# Patient Record
Sex: FEMALE | Race: WHITE | NOT HISPANIC OR LATINO
[De-identification: names, ages, dates, MRNs, and addresses within clinical notes are randomized per-mention and may not be internally consistent; named-entity substitution may affect disease eponyms.]

---

## 2019-03-09 ENCOUNTER — RESULT REVIEW (OUTPATIENT)
Age: 29
End: 2019-03-09

## 2019-05-28 PROBLEM — Z00.00 ENCOUNTER FOR PREVENTIVE HEALTH EXAMINATION: Status: ACTIVE | Noted: 2019-05-28

## 2019-06-25 ENCOUNTER — APPOINTMENT (OUTPATIENT)
Dept: OTOLARYNGOLOGY | Facility: CLINIC | Age: 29
End: 2019-06-25
Payer: COMMERCIAL

## 2019-06-25 VITALS
HEIGHT: 65 IN | HEART RATE: 57 BPM | DIASTOLIC BLOOD PRESSURE: 75 MMHG | BODY MASS INDEX: 24.16 KG/M2 | WEIGHT: 145 LBS | SYSTOLIC BLOOD PRESSURE: 106 MMHG

## 2019-06-25 PROCEDURE — 31231 NASAL ENDOSCOPY DX: CPT

## 2019-06-25 PROCEDURE — 99244 OFF/OP CNSLTJ NEW/EST MOD 40: CPT | Mod: 25

## 2019-06-25 PROCEDURE — 69210 REMOVE IMPACTED EAR WAX UNI: CPT

## 2019-06-25 RX ORDER — LORATADINE 5 MG/5 ML
10-240 SOLUTION, ORAL ORAL
Refills: 0 | Status: ACTIVE | COMMUNITY

## 2019-06-25 RX ORDER — FLUTICASONE PROPIONATE 50 MCG
50 SPRAY, SUSPENSION NASAL
Refills: 0 | Status: ACTIVE | COMMUNITY

## 2019-06-25 RX ORDER — ALBUTEROL SULFATE 90 UG/1
108 AEROSOL, METERED RESPIRATORY (INHALATION)
Refills: 0 | Status: ACTIVE | COMMUNITY

## 2019-06-25 RX ORDER — DOXYCYCLINE HYCLATE 100 MG/1
100 CAPSULE ORAL
Qty: 28 | Refills: 0 | Status: ACTIVE | COMMUNITY
Start: 2019-06-25 | End: 1900-01-01

## 2019-06-25 RX ORDER — PREDNISONE 10 MG/1
10 TABLET ORAL
Qty: 27 | Refills: 0 | Status: ACTIVE | COMMUNITY
Start: 2019-06-25 | End: 1900-01-01

## 2019-06-25 RX ORDER — METRONIDAZOLE 7.5 MG/G
0.75 GEL TOPICAL
Refills: 0 | Status: ACTIVE | COMMUNITY

## 2019-06-25 RX ORDER — OXYMETHAZOLINE HCL 0.05 MG/1
0.05 SPRAY NASAL
Qty: 1 | Refills: 0 | Status: ACTIVE | COMMUNITY
Start: 2019-06-25 | End: 1900-01-01

## 2019-06-25 NOTE — CONSULT LETTER
[FreeTextEntry1] : Dear Dr. LEE SCHNEIDER \par I had the pleasure of evaluating your patient MONAE RUSHING  thank you for allowing us to participate in their care. please see full note detailing our visit below.\par If you have any questions, please do not hesitate to call me and I would be happy to discuss further. \par \par Jose Garcia M.D.\par Attending Physician,  \par Department of Otolaryngology - Head and Neck Surgery\par [facility] \par Office: [phone]\par Fax: [phone]\par

## 2019-06-25 NOTE — PROCEDURE
[FreeTextEntry6] : Procedure performed: Nasal Endoscopy- Diagnostic\par Pre / post -procedure indication(s): nasal congestion\par Verbal and/or written consent obtained from patient\par Scope #: 1,  flexible fiber optic telescope used\par The scope was introduced in the nasal passage between the middle and inferior turbinates to exam the inferior portion of the middle meatus and the fontanelle, as well as the maxillary ostia.  Next, the scope was passed medically and posteriorly to the middle turbinates to examine the sphenoethmoid recess and the superior turbinate region.\par Upon visualization the finders are as follows:\par Nasal Septum: left septal deviation \par B/L: Mucosa: pink and moist, Mucous: frothy, clear Polyp: not seen, Inferior Turbinate, Middle and Superior Turbinate: inferior turbinate hypertrophy Inferior Meatus: narrow, Middle Meatus: narrow, Super Meatus:normal, Sphenoethmoidal Recess: clear.  Eustachian tube clear. \par The patient tolerated the procedure well\par  [FreeTextEntry3] : Procedure - Cerumen Removal. \par After informed verbal consent is obtained, cerumen is removed from the right ear canal with suction.  Normal appearing canal following removal.\par  [de-identified] : Procedure performed: laryngeal Endoscopy- Diagnostic\par Pre-op/post op indication: dysphonia\par Verbal and/or written consent obtained from patient, Patient was unable to cooperate with mirror\par Scope #: 1, flexible fiber optic telescope used \par Scope was introduced through the nose passed on the floor of the nose to the nasopharynx and then followed down the soft palate to the lower pharynx. The tongue Base, Larynx, Hypopharynx were examined. Base of tongue was symmetric, vallecular was clear, epiglottis was not deformed, subglottis/ pyriform and posterior pharyngeal walls were clear. No erythema, edema, pooling of secretions, masses or lesions. Airway patent, no foreign body visualized. No glottic/supraglottic edema. VF clear arytenoids, vestibular folds, ventricles, pyriform sinuses, and aryepiglottic folds appear normal bilaterally. Vocal cords mobile with good contact b/l.\par \par

## 2019-06-25 NOTE — PHYSICAL EXAM
[Midline] : trachea located in midline position [Normal] : no rashes [de-identified] : Right ear: CI

## 2019-06-25 NOTE — ASSESSMENT
[FreeTextEntry1] : pt with chronic and recurrent acute sinusitis with NSD who has undergone significant treatment \par We will proceed with a regiment of decongestants, antibiotics and irrigation to try to break the cycle of inflation and obstruction. 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 I spoke to LEIDY Bowser regarding the assessment and plan of care.  I preformed the procedures and I reviewed the above assessment and plan of care, and agree. I have made changes in changes in the body of the note where appropriate.\par \par

## 2019-06-25 NOTE — HISTORY OF PRESENT ILLNESS
[de-identified] : 30 y/o female with several year history of recurrent sinus infections. Reports 3 sinus infections a year typically treated with oral abx and steroids. She has constant nasal congestion R>L, mouth breathing, worse at night. When infected she has sinus pressure, discolored mucus, PND and cough. Allergy testing 2 years ago- multiple seasonal allergies- currently taking Claritin daily during Spring, Fall and Summer and Flonase PRN.\par + Runny nose\par + Nasal fracture- 1 y/o\par No changes in smell and taste.\par \par Also with 1 year history of hoarseness which she feels is worse during the end of the week. 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 No fever, chills or unintentional weight loss. \par

## 2019-06-27 ENCOUNTER — APPOINTMENT (OUTPATIENT)
Dept: SPEECH THERAPY | Facility: CLINIC | Age: 29
End: 2019-06-27

## 2019-07-02 ENCOUNTER — APPOINTMENT (OUTPATIENT)
Dept: SPEECH THERAPY | Facility: CLINIC | Age: 29
End: 2019-07-02

## 2019-07-02 ENCOUNTER — OUTPATIENT (OUTPATIENT)
Dept: OUTPATIENT SERVICES | Facility: HOSPITAL | Age: 29
LOS: 1 days | Discharge: HOME | End: 2019-07-02

## 2019-08-01 ENCOUNTER — APPOINTMENT (OUTPATIENT)
Dept: OTOLARYNGOLOGY | Facility: CLINIC | Age: 29
End: 2019-08-01
Payer: COMMERCIAL

## 2019-08-01 VITALS
SYSTOLIC BLOOD PRESSURE: 115 MMHG | DIASTOLIC BLOOD PRESSURE: 73 MMHG | HEART RATE: 63 BPM | HEIGHT: 65 IN | BODY MASS INDEX: 24.99 KG/M2 | WEIGHT: 150 LBS

## 2019-08-01 DIAGNOSIS — J34.89 OTHER SPECIFIED DISORDERS OF NOSE AND NASAL SINUSES: ICD-10-CM

## 2019-08-01 DIAGNOSIS — H61.21 IMPACTED CERUMEN, RIGHT EAR: ICD-10-CM

## 2019-08-01 PROCEDURE — 69210 REMOVE IMPACTED EAR WAX UNI: CPT

## 2019-08-01 PROCEDURE — 99214 OFFICE O/P EST MOD 30 MIN: CPT | Mod: 25

## 2019-08-01 PROCEDURE — 31231 NASAL ENDOSCOPY DX: CPT

## 2019-08-02 PROBLEM — H61.21 IMPACTED CERUMEN OF RIGHT EAR: Status: ACTIVE | Noted: 2019-06-25

## 2019-08-02 PROBLEM — J34.89 RHINORRHEA: Status: ACTIVE | Noted: 2019-06-25

## 2019-08-02 NOTE — CONSULT LETTER
[FreeTextEntry1] : Dear Dr. LEE SCHNEIDER \par I had the pleasure of evaluating your patient MONAE RUSHING  thank you for allowing us to participate in their care. please see full note detailing our visit below.\par If you have any questions, please do not hesitate to call me and I would be happy to discuss further. \par \par Jose Garcia M.D.\par Attending Physician,  \par Department of Otolaryngology - Head and Neck Surgery\[facility] \par Office: [phone]\par Fax: [phone]\par

## 2019-08-02 NOTE — PHYSICAL EXAM
[Midline] : trachea located in midline position [Normal] : no rashes [de-identified] : Right ear: CI

## 2019-08-02 NOTE — ASSESSMENT
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 I spoke to LEIDY Bowser regarding the assessment and plan of care.  I preformed the procedures and I reviewed the above assessment and plan of care, and agree. I have made changes in changes in the body of the note where appropriate.\par \par

## 2019-08-02 NOTE — HISTORY OF PRESENT ILLNESS
[de-identified] : 28 y/o female with several year history of recurrent sinus infections. Reports 3 sinus infections a year typically treated with oral abx and steroids. She has constant nasal congestion R>L, mouth breathing, worse at night. When infected she has sinus pressure, discolored mucus, PND and cough. Allergy testing 2 years ago- multiple seasonal allergies- currently taking Claritin daily during Spring, Fall and Summer and Flonase PRN.\par + Runny nose\par + Nasal fracture- 1 y/o\par No changes in smell and taste.\par \par Also with 1 year history of hoarseness which she feels is worse during the end of the week. 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 No fever, chills or unintentional weight loss. \par  [FreeTextEntry1] : did sinusitis regiment, feels breathing a bit better, no infection, facial pressure resolved \par  \par doing voice therapy, got eval , will continue\par voice stable

## 2019-08-02 NOTE — PROCEDURE
[FreeTextEntry3] : Procedure - Cerumen Removal. \par After informed verbal consent is obtained, cerumen is removed from the right ear canal with suction.  Normal appearing canal following removal.\par  [FreeTextEntry6] : Procedure performed: Nasal Endoscopy- Diagnostic\par Pre / post -procedure indication(s): nasal congestion\par Verbal and/or written consent obtained from patient\par Scope #: 1,  flexible fiber optic telescope used\par The scope was introduced in the nasal passage between the middle and inferior turbinates to exam the inferior portion of the middle meatus and the fontanelle, as well as the maxillary ostia.  Next, the scope was passed medically and posteriorly to the middle turbinates to examine the sphenoethmoid recess and the superior turbinate region.\par Upon visualization the finders are as follows:\par Nasal Septum: left septal deviation \par B/L: Mucosa: pink and moist, Mucous: frothy, clear Polyp: not seen, Inferior Turbinate, Middle and Superior Turbinate: inferior turbinate hypertrophy Inferior Meatus: narrow, Middle Meatus: narrow, Super Meatus:normal, Sphenoethmoidal Recess: clear.  Eustachian tube clear. \par The patient tolerated the procedure well\par  [de-identified] : Procedure performed: laryngeal Endoscopy- Diagnostic\par Pre-op/post op indication: dysphonia\par Verbal and/or written consent obtained from patient, Patient was unable to cooperate with mirror\par Scope #: 1, flexible fiber optic telescope used \par Scope was introduced through the nose passed on the floor of the nose to the nasopharynx and then followed down the soft palate to the lower pharynx. The tongue Base, Larynx, Hypopharynx were examined. Base of tongue was symmetric, vallecular was clear, epiglottis was not deformed, subglottis/ pyriform and posterior pharyngeal walls were clear. No erythema, edema, pooling of secretions, masses or lesions. Airway patent, no foreign body visualized. No glottic/supraglottic edema. VF clear arytenoids, vestibular folds, ventricles, pyriform sinuses, and aryepiglottic folds appear normal bilaterally. Vocal cords mobile with good contact b/l.\par \par

## 2019-08-06 DIAGNOSIS — R49.0 DYSPHONIA: ICD-10-CM

## 2020-01-07 ENCOUNTER — APPOINTMENT (OUTPATIENT)
Dept: OTOLARYNGOLOGY | Facility: CLINIC | Age: 30
End: 2020-01-07
Payer: COMMERCIAL

## 2020-01-07 VITALS
HEART RATE: 63 BPM | DIASTOLIC BLOOD PRESSURE: 65 MMHG | SYSTOLIC BLOOD PRESSURE: 104 MMHG | BODY MASS INDEX: 24.99 KG/M2 | HEIGHT: 65 IN | WEIGHT: 150 LBS

## 2020-01-07 DIAGNOSIS — R49.0 DYSPHONIA: ICD-10-CM

## 2020-01-07 DIAGNOSIS — J34.2 DEVIATED NASAL SEPTUM: ICD-10-CM

## 2020-01-07 DIAGNOSIS — J32.4 CHRONIC PANSINUSITIS: ICD-10-CM

## 2020-01-07 DIAGNOSIS — R09.81 NASAL CONGESTION: ICD-10-CM

## 2020-01-07 DIAGNOSIS — J34.3 HYPERTROPHY OF NASAL TURBINATES: ICD-10-CM

## 2020-01-07 PROCEDURE — 99214 OFFICE O/P EST MOD 30 MIN: CPT | Mod: 25

## 2020-01-07 PROCEDURE — 31231 NASAL ENDOSCOPY DX: CPT

## 2020-01-07 NOTE — PHYSICAL EXAM
[Midline] : trachea located in midline position [Normal] : orientation to person, place, and time: normal [de-identified] : Right ear: CI

## 2020-01-07 NOTE — CONSULT LETTER
[FreeTextEntry1] : Dear Dr. LEE SCHNEIDER \par I had the pleasure of evaluating your patient MONAE RUSHING  thank you for allowing us to participate in their care. please see full note detailing our visit below.\par If you have any questions, please do not hesitate to call me and I would be happy to discuss further. \par \par Jose Garcia M.D.\par Attending Physician,  \par Department of Otolaryngology - Head and Neck Surgery\par [facility] \par Office: [phone]\par Fax: [phone]\par

## 2020-01-07 NOTE — ASSESSMENT
[FreeTextEntry1] : Pt with chronic and recurrent acute sinusitis with NSD who has undergone significant treatment \par with some response to sinusitis regiment, will continue medical tx and see if continues to occur, if recurs in immediate future will consider imaging/ procedure \par - Continue saline spray\par \par Dysphonia with laryngeal hyperfunction and early nodules\par - Voice therapy\par \par \par I personally saw and examined MONAE RUSHING in detail. I spoke to LEIDY Bowser regarding the assessment and plan of care.  I preformed the procedures and I reviewed the above assessment and plan of care, and agree. I have made changes in changes in the body of the note where appropriate.\par \par \par \par

## 2020-01-07 NOTE — PROCEDURE
[FreeTextEntry3] : Procedure - Cerumen Removal. \par After informed verbal consent is obtained, cerumen is removed from the right ear canal with suction.  Normal appearing canal following removal.\par  [FreeTextEntry6] : Procedure performed: Nasal Endoscopy- Diagnostic\par Pre / post -procedure indication(s): nasal congestion\par Verbal and/or written consent obtained from patient\par Scope #: 1,  flexible fiber optic telescope used\par The scope was introduced in the nasal passage between the middle and inferior turbinates to exam the inferior portion of the middle meatus and the fontanelle, as well as the maxillary ostia.  Next, the scope was passed medically and posteriorly to the middle turbinates to examine the sphenoethmoid recess and the superior turbinate region.\par Upon visualization the finders are as follows:\par Nasal Septum: left septal deviation \par B/L: Mucosa: pink and moist, Mucous: frothy, clear Polyp: not seen, Inferior Turbinate, Middle and Superior Turbinate: inferior turbinate hypertrophy Inferior Meatus: narrow, Middle Meatus: narrow, Super Meatus:normal, Sphenoethmoidal Recess: clear.  Eustachian tube clear. \par The patient tolerated the procedure well\par  [de-identified] : Procedure performed: laryngeal Endoscopy- Diagnostic\par Pre-op/post op indication: dysphonia\par Verbal and/or written consent obtained from patient, Patient was unable to cooperate with mirror\par Scope #: 1, flexible fiber optic telescope used \par Scope was introduced through the nose passed on the floor of the nose to the nasopharynx and then followed down the soft palate to the lower pharynx. The tongue Base, Larynx, Hypopharynx were examined. Base of tongue was symmetric, vallecular was clear, epiglottis was not deformed, subglottis/ pyriform and posterior pharyngeal walls were clear. No erythema, edema, pooling of secretions, masses or lesions. Airway patent, no foreign body visualized. No glottic/supraglottic edema. VF clear arytenoids, vestibular folds, ventricles, pyriform sinuses, and aryepiglottic folds appear normal bilaterally. Vocal cords mobile with good contact b/l.\par \par

## 2020-01-07 NOTE — HISTORY OF PRESENT ILLNESS
[de-identified] : 29 y/o female with several year history of recurrent sinus infections. Reports 3 sinus infections a year typically treated with oral abx and steroids. She has constant nasal congestion R>L, mouth breathing, worse at night. Allergy testing 2 years ago- multiple seasonal allergies, year round.Sinus infections and nasal congestion usually worse in the Spring and Fall. She was treated with sinusitis regimen 6 months ago and hasn't had sinus infection since. Still with mostly mouth breathing and nasal congestion R>L. She's still using saline spray daily. \par + nasal fracture- 1 y/o\par No changes in smell and taste.\par \par Also with over 1 year history of hoarseness which she feels is worse during the end of the week. 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 No fever, chills or unintentional weight loss. \par  [FreeTextEntry1] : \par  \par

## 2020-09-29 ENCOUNTER — RESULT REVIEW (OUTPATIENT)
Age: 30
End: 2020-09-29

## 2021-03-08 ENCOUNTER — FORM ENCOUNTER (OUTPATIENT)
Age: 31
End: 2021-03-08

## 2021-03-09 ENCOUNTER — APPOINTMENT (OUTPATIENT)
Dept: OBGYN | Facility: CLINIC | Age: 31
End: 2021-03-09
Payer: COMMERCIAL

## 2021-03-09 ENCOUNTER — TRANSCRIPTION ENCOUNTER (OUTPATIENT)
Age: 31
End: 2021-03-09

## 2021-03-09 PROCEDURE — 81025 URINE PREGNANCY TEST: CPT

## 2021-03-09 PROCEDURE — 99213 OFFICE O/P EST LOW 20 MIN: CPT | Mod: 25

## 2021-03-09 PROCEDURE — 99385 PREV VISIT NEW AGE 18-39: CPT

## 2021-03-09 PROCEDURE — 36415 COLL VENOUS BLD VENIPUNCTURE: CPT

## 2021-03-09 PROCEDURE — 76817 TRANSVAGINAL US OBSTETRIC: CPT

## 2021-03-09 PROCEDURE — 99072 ADDL SUPL MATRL&STAF TM PHE: CPT

## 2021-03-14 ENCOUNTER — FORM ENCOUNTER (OUTPATIENT)
Age: 31
End: 2021-03-14

## 2021-03-23 ENCOUNTER — FORM ENCOUNTER (OUTPATIENT)
Age: 31
End: 2021-03-23

## 2021-04-02 ENCOUNTER — NON-APPOINTMENT (OUTPATIENT)
Age: 31
End: 2021-04-02

## 2021-04-12 ENCOUNTER — FORM ENCOUNTER (OUTPATIENT)
Age: 31
End: 2021-04-12

## 2021-04-19 ENCOUNTER — FORM ENCOUNTER (OUTPATIENT)
Age: 31
End: 2021-04-19

## 2021-04-20 ENCOUNTER — APPOINTMENT (OUTPATIENT)
Dept: OBGYN | Facility: CLINIC | Age: 31
End: 2021-04-20

## 2021-04-20 ENCOUNTER — APPOINTMENT (OUTPATIENT)
Dept: OBGYN | Facility: CLINIC | Age: 31
End: 2021-04-20
Payer: COMMERCIAL

## 2021-04-20 PROCEDURE — 99072 ADDL SUPL MATRL&STAF TM PHE: CPT

## 2021-04-20 PROCEDURE — 76813 OB US NUCHAL MEAS 1 GEST: CPT

## 2021-04-29 ENCOUNTER — FORM ENCOUNTER (OUTPATIENT)
Age: 31
End: 2021-04-29

## 2021-05-04 ENCOUNTER — FORM ENCOUNTER (OUTPATIENT)
Age: 31
End: 2021-05-04

## 2021-05-18 ENCOUNTER — APPOINTMENT (OUTPATIENT)
Dept: OBGYN | Facility: CLINIC | Age: 31
End: 2021-05-18
Payer: COMMERCIAL

## 2021-05-18 PROCEDURE — 36415 COLL VENOUS BLD VENIPUNCTURE: CPT

## 2021-05-18 PROCEDURE — 0502F SUBSEQUENT PRENATAL CARE: CPT

## 2021-06-03 ENCOUNTER — FORM ENCOUNTER (OUTPATIENT)
Age: 31
End: 2021-06-03

## 2021-06-11 ENCOUNTER — ASOB RESULT (OUTPATIENT)
Age: 31
End: 2021-06-11

## 2021-06-11 ENCOUNTER — APPOINTMENT (OUTPATIENT)
Dept: ANTEPARTUM | Facility: CLINIC | Age: 31
End: 2021-06-11
Payer: COMMERCIAL

## 2021-06-11 PROCEDURE — 76811 OB US DETAILED SNGL FETUS: CPT

## 2021-06-22 ENCOUNTER — APPOINTMENT (OUTPATIENT)
Dept: OBGYN | Facility: CLINIC | Age: 31
End: 2021-06-22
Payer: COMMERCIAL

## 2021-06-22 PROCEDURE — 0502F SUBSEQUENT PRENATAL CARE: CPT

## 2021-07-28 ENCOUNTER — FORM ENCOUNTER (OUTPATIENT)
Age: 31
End: 2021-07-28

## 2021-07-28 ENCOUNTER — APPOINTMENT (OUTPATIENT)
Dept: OBGYN | Facility: CLINIC | Age: 31
End: 2021-07-28
Payer: COMMERCIAL

## 2021-07-28 PROCEDURE — 0502F SUBSEQUENT PRENATAL CARE: CPT

## 2021-07-29 ENCOUNTER — FORM ENCOUNTER (OUTPATIENT)
Age: 31
End: 2021-07-29

## 2021-08-24 ENCOUNTER — FORM ENCOUNTER (OUTPATIENT)
Age: 31
End: 2021-08-24

## 2021-08-25 ENCOUNTER — APPOINTMENT (OUTPATIENT)
Dept: OBGYN | Facility: CLINIC | Age: 31
End: 2021-08-25
Payer: COMMERCIAL

## 2021-08-25 VITALS
HEIGHT: 65 IN | DIASTOLIC BLOOD PRESSURE: 72 MMHG | BODY MASS INDEX: 38.65 KG/M2 | WEIGHT: 232 LBS | SYSTOLIC BLOOD PRESSURE: 110 MMHG

## 2021-08-25 PROCEDURE — 76816 OB US FOLLOW-UP PER FETUS: CPT

## 2021-08-25 PROCEDURE — 90471 IMMUNIZATION ADMIN: CPT

## 2021-08-25 PROCEDURE — 0502F SUBSEQUENT PRENATAL CARE: CPT

## 2021-08-25 PROCEDURE — 90715 TDAP VACCINE 7 YRS/> IM: CPT

## 2021-08-31 ENCOUNTER — FORM ENCOUNTER (OUTPATIENT)
Age: 31
End: 2021-08-31

## 2021-09-15 ENCOUNTER — NON-APPOINTMENT (OUTPATIENT)
Age: 31
End: 2021-09-15

## 2021-09-15 DIAGNOSIS — J45.909 UNSPECIFIED ASTHMA, UNCOMPLICATED: ICD-10-CM

## 2021-09-15 DIAGNOSIS — E78.00 PURE HYPERCHOLESTEROLEMIA, UNSPECIFIED: ICD-10-CM

## 2021-09-15 DIAGNOSIS — Z83.3 FAMILY HISTORY OF DIABETES MELLITUS: ICD-10-CM

## 2021-09-15 DIAGNOSIS — Z78.9 OTHER SPECIFIED HEALTH STATUS: ICD-10-CM

## 2021-09-15 DIAGNOSIS — Z92.89 PERSONAL HISTORY OF OTHER MEDICAL TREATMENT: ICD-10-CM

## 2021-09-15 DIAGNOSIS — Z82.49 FAMILY HISTORY OF ISCHEMIC HEART DISEASE AND OTHER DISEASES OF THE CIRCULATORY SYSTEM: ICD-10-CM

## 2021-09-15 DIAGNOSIS — O99.213 OBESITY COMPLICATING PREGNANCY, THIRD TRIMESTER: ICD-10-CM

## 2021-09-15 DIAGNOSIS — Z80.3 FAMILY HISTORY OF MALIGNANT NEOPLASM OF BREAST: ICD-10-CM

## 2021-09-15 RX ORDER — VITAMIN A ACETATE, ASCORBIC ACID, CHOLECALCIFEROL, ALPHA-TOCOPHEROL ACETATE, DL, THIAMINE MONONITRATE, RIBOFLAVIN, NIACINAMIDE, PYRIDOXINE HYDROCHLORIDE, FOLIC ACID, CYANOCOBALAMIN, BIOTIN, CALCIUM PANTOTHENATE, CALCIUM CARBONATE, FERROUS FUMARATE, POTASSIUM IODIDE, MAGNESIUM OXIDE, ZINC OXIDE AND CUPRIC OXIDE 2500; 80; 400; 10; 3; 3.4; 20; 20; 1; 12; 300; 6; 120; 27; 150; 30; 15; 2 [IU]/1; MG/1; [IU]/1; [IU]/1; MG/1; MG/1; MG/1; MG/1; MG/1; UG/1; UG/1; MG/1; MG/1; MG/1; UG/1; UG/1; UG/1; MG/1
TABLET, FILM COATED ORAL
Refills: 0 | Status: ACTIVE | COMMUNITY

## 2021-09-20 ENCOUNTER — FORM ENCOUNTER (OUTPATIENT)
Age: 31
End: 2021-09-20

## 2021-09-20 ENCOUNTER — APPOINTMENT (OUTPATIENT)
Dept: PEDIATRICS | Facility: CLINIC | Age: 31
End: 2021-09-20
Payer: COMMERCIAL

## 2021-09-20 PROCEDURE — 99203 OFFICE O/P NEW LOW 30 MIN: CPT | Mod: 95

## 2021-09-21 ENCOUNTER — APPOINTMENT (OUTPATIENT)
Dept: OBGYN | Facility: CLINIC | Age: 31
End: 2021-09-21
Payer: COMMERCIAL

## 2021-09-21 PROCEDURE — 0502F SUBSEQUENT PRENATAL CARE: CPT

## 2021-09-22 ENCOUNTER — TRANSCRIPTION ENCOUNTER (OUTPATIENT)
Age: 31
End: 2021-09-22

## 2021-09-23 ENCOUNTER — EMERGENCY (EMERGENCY)
Facility: HOSPITAL | Age: 31
LOS: 1 days | Discharge: HOME | End: 2021-09-23
Admitting: EMERGENCY MEDICINE
Payer: COMMERCIAL

## 2021-09-23 ENCOUNTER — APPOINTMENT (OUTPATIENT)
Age: 31
End: 2021-09-23

## 2021-09-23 VITALS
RESPIRATION RATE: 16 BRPM | DIASTOLIC BLOOD PRESSURE: 84 MMHG | WEIGHT: 235.01 LBS | SYSTOLIC BLOOD PRESSURE: 134 MMHG | TEMPERATURE: 99 F | OXYGEN SATURATION: 98 % | HEART RATE: 77 BPM | HEIGHT: 65 IN

## 2021-09-23 VITALS
HEART RATE: 79 BPM | RESPIRATION RATE: 18 BRPM | TEMPERATURE: 98 F | OXYGEN SATURATION: 99 % | DIASTOLIC BLOOD PRESSURE: 63 MMHG | SYSTOLIC BLOOD PRESSURE: 107 MMHG

## 2021-09-23 DIAGNOSIS — U07.1 COVID-19: ICD-10-CM

## 2021-09-23 DIAGNOSIS — Z88.0 ALLERGY STATUS TO PENICILLIN: ICD-10-CM

## 2021-09-23 DIAGNOSIS — R05 COUGH: ICD-10-CM

## 2021-09-23 PROCEDURE — M0243: CPT | Mod: 52

## 2021-09-23 PROCEDURE — L9998: CPT

## 2021-09-23 RX ORDER — EPINEPHRINE 0.3 MG/.3ML
0.3 INJECTION INTRAMUSCULAR; SUBCUTANEOUS ONCE
Refills: 0 | Status: DISCONTINUED | OUTPATIENT
Start: 2021-09-23 | End: 2021-09-27

## 2021-09-23 RX ORDER — DIPHENHYDRAMINE HCL 50 MG
50 CAPSULE ORAL ONCE
Refills: 0 | Status: DISCONTINUED | OUTPATIENT
Start: 2021-09-23 | End: 2021-09-27

## 2021-09-23 RX ORDER — FAMOTIDINE 10 MG/ML
20 INJECTION INTRAVENOUS ONCE
Refills: 0 | Status: DISCONTINUED | OUTPATIENT
Start: 2021-09-23 | End: 2021-09-27

## 2021-09-23 RX ORDER — SODIUM CHLORIDE 9 MG/ML
250 INJECTION INTRAMUSCULAR; INTRAVENOUS; SUBCUTANEOUS
Refills: 0 | Status: COMPLETED | OUTPATIENT
Start: 2021-09-23 | End: 2021-09-23

## 2021-09-23 RX ADMIN — SODIUM CHLORIDE 310 MILLILITER(S): 9 INJECTION INTRAMUSCULAR; INTRAVENOUS; SUBCUTANEOUS at 18:16

## 2021-09-23 NOTE — ED PROVIDER NOTE - NS ED ROS FT
CONSTITUTIONAL:  No fever or chills, no bodyaches  HEENT:  No sore throat or headache, no nasal congestion  PULM:  +cough  GI:  No diarrhea, no vomiting

## 2021-09-23 NOTE — ED PROVIDER NOTE - OBJECTIVE STATEMENT
Patient is scheduled for [facility] and requesting no professional billing.  reports congestion, sinus pressure and cough x 5 days.   pt fully vaccinated

## 2021-09-23 NOTE — ED PROVIDER NOTE - PATIENT PORTAL LINK FT
You can access the FollowMyHealth Patient Portal offered by [facility] by registering at the following website: [email or web address]. By joining [facility]’s FollowMyHealth portal, you will also be able to view your health information using other applications (apps) compatible with our system.

## 2021-09-23 NOTE — ED PROVIDER NOTE - PROGRESS NOTE DETAILS
Patient has history of COVID-19 symptoms for [5  ] days    Patient has high risk factors that include:  [  ] Chronic Kidney Disease [  ] Diabetes Mellitus [  ] Immune Suppressive [  ] Receiving Immunosuppressive Treatment [ x ] Overweight/Obesity BMI greater than 25 [  ] Age greater than or equal 65 years [  ] Cardiovascular Disease [  ] HTN [  ] COPD/Other Chronic Respiratory Disease [  ] Sickle Cell Disease [  ] Congenital/Acquired Heart Disease [  ] Neurodevelopmental Disorder [  ] Medical related technology dependence [  ] Asthma/Chronic Respiratory Disease [  ] Immune Suppressive Disease [  ] Receiving Immune Suppressive Therapy [  ] Other: pregnant     Patient received prior COVID treatment that included [  ]  Patient provided explanation of monoclonal antibody infusion and is in agreement with treatment plan. See consent form in medical record. [  ] Patient received monoclonal antibody infusion with no adverse reaction. Patient planned for discharge home and follow up with outpatient [facility] program.    [  ] Patient received monoclonal antibody infusion and experienced an adverse reaction. Symptoms include [  ]. Patient treated with [  ]. Patient planned for [  ] [ x ] Patient received monoclonal antibody infusion with no adverse reaction. Patient planned for discharge home and follow up with outpatient [facility] program.

## 2021-09-24 ENCOUNTER — APPOINTMENT (OUTPATIENT)
Dept: DISASTER EMERGENCY | Facility: HOSPITAL | Age: 31
End: 2021-09-24

## 2021-09-29 ENCOUNTER — APPOINTMENT (OUTPATIENT)
Dept: OBGYN | Facility: CLINIC | Age: 31
End: 2021-09-29

## 2021-10-01 ENCOUNTER — APPOINTMENT (OUTPATIENT)
Dept: OBGYN | Facility: CLINIC | Age: 31
End: 2021-10-01

## 2021-10-04 DIAGNOSIS — Z34.00 ENCOUNTER FOR SUPERVISION OF NORMAL FIRST PREGNANCY, UNSPECIFIED TRIMESTER: ICD-10-CM

## 2021-10-04 DIAGNOSIS — U07.1 COVID-19: ICD-10-CM

## 2021-10-07 ENCOUNTER — APPOINTMENT (OUTPATIENT)
Dept: OBGYN | Facility: CLINIC | Age: 31
End: 2021-10-07

## 2021-10-15 ENCOUNTER — APPOINTMENT (OUTPATIENT)
Dept: OBGYN | Facility: CLINIC | Age: 31
End: 2021-10-15
Payer: COMMERCIAL

## 2021-10-15 PROCEDURE — 0502F SUBSEQUENT PRENATAL CARE: CPT

## 2021-10-20 ENCOUNTER — APPOINTMENT (OUTPATIENT)
Dept: OBGYN | Facility: CLINIC | Age: 31
End: 2021-10-20
Payer: COMMERCIAL

## 2021-10-20 PROCEDURE — 0502F SUBSEQUENT PRENATAL CARE: CPT

## 2021-10-26 ENCOUNTER — ASOB RESULT (OUTPATIENT)
Age: 31
End: 2021-10-26

## 2021-10-26 ENCOUNTER — APPOINTMENT (OUTPATIENT)
Dept: OBGYN | Facility: CLINIC | Age: 31
End: 2021-10-26
Payer: COMMERCIAL

## 2021-10-26 PROCEDURE — 76818 FETAL BIOPHYS PROFILE W/NST: CPT

## 2021-10-26 PROCEDURE — 76816 OB US FOLLOW-UP PER FETUS: CPT

## 2021-10-29 ENCOUNTER — INPATIENT (INPATIENT)
Facility: HOSPITAL | Age: 31
LOS: 3 days | Discharge: HOME | End: 2021-11-02
Attending: OBSTETRICS & GYNECOLOGY | Admitting: OBSTETRICS & GYNECOLOGY
Payer: COMMERCIAL

## 2021-10-29 ENCOUNTER — APPOINTMENT (OUTPATIENT)
Dept: OBGYN | Facility: CLINIC | Age: 31
End: 2021-10-29
Payer: COMMERCIAL

## 2021-10-29 VITALS — OXYGEN SATURATION: 98 % | HEART RATE: 94 BPM

## 2021-10-29 DIAGNOSIS — O26.899 OTHER SPECIFIED PREGNANCY RELATED CONDITIONS, UNSPECIFIED TRIMESTER: ICD-10-CM

## 2021-10-29 DIAGNOSIS — Z3A.00 WEEKS OF GESTATION OF PREGNANCY NOT SPECIFIED: ICD-10-CM

## 2021-10-29 DIAGNOSIS — Z34.80 ENCOUNTER FOR SUPERVISION OF OTHER NORMAL PREGNANCY, UNSPECIFIED TRIMESTER: ICD-10-CM

## 2021-10-29 LAB
ALBUMIN SERPL ELPH-MCNC: 3.4 G/DL — SIGNIFICANT CHANGE UP (ref 3.3–5)
ALP SERPL-CCNC: 133 U/L — HIGH (ref 40–120)
ALT FLD-CCNC: 19 U/L — SIGNIFICANT CHANGE UP (ref 10–45)
ANION GAP SERPL CALC-SCNC: 12 MMOL/L — SIGNIFICANT CHANGE UP (ref 5–17)
APPEARANCE UR: CLEAR — SIGNIFICANT CHANGE UP
APTT BLD: 26.9 SEC — LOW (ref 27.5–35.5)
AST SERPL-CCNC: 22 U/L — SIGNIFICANT CHANGE UP (ref 10–40)
BASOPHILS # BLD AUTO: 0.03 K/UL — SIGNIFICANT CHANGE UP (ref 0–0.2)
BASOPHILS NFR BLD AUTO: 0.2 % — SIGNIFICANT CHANGE UP (ref 0–2)
BILIRUB SERPL-MCNC: 0.5 MG/DL — SIGNIFICANT CHANGE UP (ref 0.2–1.2)
BILIRUB UR-MCNC: NEGATIVE — SIGNIFICANT CHANGE UP
BLD GP AB SCN SERPL QL: NEGATIVE — SIGNIFICANT CHANGE UP
BUN SERPL-MCNC: 10 MG/DL — SIGNIFICANT CHANGE UP (ref 7–23)
CALCIUM SERPL-MCNC: 9.3 MG/DL — SIGNIFICANT CHANGE UP (ref 8.4–10.5)
CHLORIDE SERPL-SCNC: 103 MMOL/L — SIGNIFICANT CHANGE UP (ref 96–108)
CO2 SERPL-SCNC: 19 MMOL/L — LOW (ref 22–31)
COLOR SPEC: SIGNIFICANT CHANGE UP
CREAT ?TM UR-MCNC: 70 MG/DL — SIGNIFICANT CHANGE UP
CREAT SERPL-MCNC: 0.45 MG/DL — LOW (ref 0.5–1.3)
DIFF PNL FLD: NEGATIVE — SIGNIFICANT CHANGE UP
EOSINOPHIL # BLD AUTO: 0.07 K/UL — SIGNIFICANT CHANGE UP (ref 0–0.5)
EOSINOPHIL NFR BLD AUTO: 0.6 % — SIGNIFICANT CHANGE UP (ref 0–6)
FIBRINOGEN PPP-MCNC: 830 MG/DL — HIGH (ref 290–520)
GLUCOSE SERPL-MCNC: 86 MG/DL — SIGNIFICANT CHANGE UP (ref 70–99)
GLUCOSE UR QL: NEGATIVE — SIGNIFICANT CHANGE UP
HCT VFR BLD CALC: 41.5 % — SIGNIFICANT CHANGE UP (ref 34.5–45)
HGB BLD-MCNC: 13.3 G/DL — SIGNIFICANT CHANGE UP (ref 11.5–15.5)
IMM GRANULOCYTES NFR BLD AUTO: 0.6 % — SIGNIFICANT CHANGE UP (ref 0–1.5)
INR BLD: 0.89 RATIO — SIGNIFICANT CHANGE UP (ref 0.88–1.16)
KETONES UR-MCNC: NEGATIVE — SIGNIFICANT CHANGE UP
LDH SERPL L TO P-CCNC: 184 U/L — SIGNIFICANT CHANGE UP (ref 50–242)
LEUKOCYTE ESTERASE UR-ACNC: NEGATIVE — SIGNIFICANT CHANGE UP
LYMPHOCYTES # BLD AUTO: 1.85 K/UL — SIGNIFICANT CHANGE UP (ref 1–3.3)
LYMPHOCYTES # BLD AUTO: 15.2 % — SIGNIFICANT CHANGE UP (ref 13–44)
MCHC RBC-ENTMCNC: 27.8 PG — SIGNIFICANT CHANGE UP (ref 27–34)
MCHC RBC-ENTMCNC: 32 GM/DL — SIGNIFICANT CHANGE UP (ref 32–36)
MCV RBC AUTO: 86.6 FL — SIGNIFICANT CHANGE UP (ref 80–100)
MONOCYTES # BLD AUTO: 0.72 K/UL — SIGNIFICANT CHANGE UP (ref 0–0.9)
MONOCYTES NFR BLD AUTO: 5.9 % — SIGNIFICANT CHANGE UP (ref 2–14)
NEUTROPHILS # BLD AUTO: 9.41 K/UL — HIGH (ref 1.8–7.4)
NEUTROPHILS NFR BLD AUTO: 77.5 % — HIGH (ref 43–77)
NITRITE UR-MCNC: NEGATIVE — SIGNIFICANT CHANGE UP
NRBC # BLD: 0 /100 WBCS — SIGNIFICANT CHANGE UP (ref 0–0)
PH UR: 7 — SIGNIFICANT CHANGE UP (ref 5–8)
PLATELET # BLD AUTO: 245 K/UL — SIGNIFICANT CHANGE UP (ref 150–400)
POTASSIUM SERPL-MCNC: 3.9 MMOL/L — SIGNIFICANT CHANGE UP (ref 3.5–5.3)
POTASSIUM SERPL-SCNC: 3.9 MMOL/L — SIGNIFICANT CHANGE UP (ref 3.5–5.3)
PROT ?TM UR-MCNC: 8 MG/DL — SIGNIFICANT CHANGE UP (ref 0–12)
PROT SERPL-MCNC: 6.3 G/DL — SIGNIFICANT CHANGE UP (ref 6–8.3)
PROT UR-MCNC: NEGATIVE — SIGNIFICANT CHANGE UP
PROT/CREAT UR-RTO: 0.1 RATIO — SIGNIFICANT CHANGE UP (ref 0–0.2)
PROTHROM AB SERPL-ACNC: 10.7 SEC — SIGNIFICANT CHANGE UP (ref 10.6–13.6)
RBC # BLD: 4.79 M/UL — SIGNIFICANT CHANGE UP (ref 3.8–5.2)
RBC # FLD: 14.7 % — HIGH (ref 10.3–14.5)
RH IG SCN BLD-IMP: POSITIVE — SIGNIFICANT CHANGE UP
SARS-COV-2 RNA SPEC QL NAA+PROBE: SIGNIFICANT CHANGE UP
SODIUM SERPL-SCNC: 134 MMOL/L — LOW (ref 135–145)
SP GR SPEC: 1.02 — SIGNIFICANT CHANGE UP (ref 1.01–1.02)
URATE SERPL-MCNC: 3.8 MG/DL — SIGNIFICANT CHANGE UP (ref 2.5–7)
UROBILINOGEN FLD QL: NEGATIVE — SIGNIFICANT CHANGE UP
WBC # BLD: 12.15 K/UL — HIGH (ref 3.8–10.5)
WBC # FLD AUTO: 12.15 K/UL — HIGH (ref 3.8–10.5)

## 2021-10-29 PROCEDURE — 59025 FETAL NON-STRESS TEST: CPT

## 2021-10-29 RX ORDER — SODIUM CHLORIDE 9 MG/ML
1000 INJECTION, SOLUTION INTRAVENOUS
Refills: 0 | Status: DISCONTINUED | OUTPATIENT
Start: 2021-10-29 | End: 2021-10-31

## 2021-10-29 RX ORDER — CITRIC ACID/SODIUM CITRATE 300-500 MG
15 SOLUTION, ORAL ORAL EVERY 6 HOURS
Refills: 0 | Status: DISCONTINUED | OUTPATIENT
Start: 2021-10-29 | End: 2021-10-31

## 2021-10-29 RX ORDER — OXYTOCIN 10 UNIT/ML
333.33 VIAL (ML) INJECTION
Qty: 20 | Refills: 0 | Status: COMPLETED | OUTPATIENT
Start: 2021-10-29 | End: 2021-10-29

## 2021-10-29 RX ORDER — INFLUENZA VIRUS VACCINE 15; 15; 15; 15 UG/.5ML; UG/.5ML; UG/.5ML; UG/.5ML
0.5 SUSPENSION INTRAMUSCULAR ONCE
Refills: 0 | Status: COMPLETED | OUTPATIENT
Start: 2021-10-29 | End: 2021-10-31

## 2021-10-29 NOTE — OB PROVIDER H&P - HISTORY OF PRESENT ILLNESS
HPI: Pt is a 31y G_P_  presenting for X.  FM  LOF (color/time)  CTX (frequency, when they started)  VB  GBS pos/neg  EFW:    PNC complicated by     OBHx:  GynHx: Denies hx of fibroids, STDs, or other gynecologic issues  PMHx: Denies  PSHx: Denies  Med: PNV  All: NKDA  Psych: Denies hx of mental health issues  SH: Denies hx of smoking, drinking, or drug usage during the pregnancy    Vital Signs Last 24 Hrs  T(C): 37.5 (29 Oct 2021 11:31), Max: 37.5 (29 Oct 2021 11:29)  T(F): 99.5 (29 Oct 2021 11:31), Max: 99.5 (29 Oct 2021 11:29)  HR: 76 (29 Oct 2021 11:56) (76 - 94)  BP: 116/71 (29 Oct 2021 11:44) (116/71 - 119/76)  BP(mean): --  RR: 16 (29 Oct 2021 11:31) (16 - 16)  SpO2: 98% (29 Oct 2021 11:56) (97% - 98%)    SVE:  FHT:  [place]:  EFW: Sono/leopolds  Sono: Vertex           HPI: Pt is a 31y  @ 40.2wga presenting after having had decels in the office   FM (+)  LOF (-)  CTX (-)  VB (-)  GBS neg  EFW: 3430g    PNC uncomplicated per pt     OBHx:   GynHx: Denies hx of fibroids, STDs, or other gynecologic issues  PMHx: Asthma, last used inhaler years ago   PSHx: Denies  Med: PNV  All: PCN->rash  Psych: Denies hx of mental health issues  SH: Denies hx of smoking, drinking, or drug usage during the pregnancy    Vital Signs Last 24 Hrs  T(C): 37.5 (29 Oct 2021 11:31), Max: 37.5 (29 Oct 2021 11:29)  T(F): 99.5 (29 Oct 2021 11:31), Max: 99.5 (29 Oct 2021 11:29)  HR: 76 (29 Oct 2021 11:56) (76 - 94)  BP: 116/71 (29 Oct 2021 11:44) (116/71 - 119/76)  BP(mean): --  RR: 16 (29 Oct 2021 11:31) (16 - 16)  SpO2: 98% (29 Oct 2021 11:56) (97% - 98%)    SVE:   FHT:  [place]:  Sono: Cephalic            HPI: Pt is a 31y  @ 40.2wga presenting after having had decels in the office   FM (+)  LOF (-)  CTX (-)  VB (-)  GBS neg  EFW: 3430g    PNC uncomplicated per pt     OBHx:   GynHx: Denies hx of fibroids, STDs, or other gynecologic issues  PMHx: Asthma, last used inhaler years ago   PSHx: Denies  Med: PNV  All: PCN->rash  Psych: Denies hx of mental health issues  SH: Denies hx of smoking, drinking, or drug usage during the pregnancy    Vital Signs Last 24 Hrs  T(C): 37.5 (29 Oct 2021 11:31), Max: 37.5 (29 Oct 2021 11:29)  T(F): 99.5 (29 Oct 2021 11:31), Max: 99.5 (29 Oct 2021 11:29)  HR: 76 (29 Oct 2021 11:56) (76 - 94)  BP: 116/71 (29 Oct 2021 11:44) (116/71 - 119/76)  BP(mean): --  RR: 16 (29 Oct 2021 11:31) (16 - 16)  SpO2: 98% (29 Oct 2021 11:56) (97% - 98%)    SVE: /60/-3  FHT: 130/mod/(+)accels/(-)decels   [place]: Irregular   Sono: Cephalic

## 2021-10-29 NOTE — OB PROVIDER H&P - ASSESSMENT
A/P: Pt is a 31y yo G_P_ who presents [active labor/SROM/induction of labor].  - prenatal issues, GBS status    1. Admit to LND. Routine Labs. IVF  2. Expectant Management vs. IOL  3. Fetus: Cat X tracing, Vertex, EFW _ . C/w EFM.  4. List all prenatal issues w/ a plan (PEC, GDM, TOLAC, Asthma, etc)  5. GBS status + plan  6. Pain: IV pain meds/epidural PRN  7. Covid swab obtained and sent     Jose Ramon Drummond, PGY-1  Obstetrics and Gynecology    Discussed with  A/P: Pt is a 32yo  @ 40.2wga who presents for IOL after having a NRFHT in the office today. Given gestational age, plan will be for IOL   - prenatal issues, GBS status    1. Admit to LND. Routine Labs. IVF  2. IOL via PO Misoprostol   3. Fetus: Cat X tracing, Vertex, EFW _ . C/w EFM.  4. List all prenatal issues w/ a plan (PEC, GDM, TOLAC, Asthma, etc)  5. GBS status + plan  6. Pain: IV pain meds/epidural PRN  7. Covid swab obtained and sent     Jose Ramon Drummond, PGY-1  Obstetrics and Gynecology    Discussed with Dr. Coronado  A/P: Pt is a 32yo  @ 40.2wga who presents for IOL after having a NRFHT in the office today. Given gestational age, plan will be for IOL       1. Admit to LND. Routine Labs. IVF  2. IOL via PO Misoprostol   3. Fetus: Cat 1 tracing, cephalic  4. Elevated pressures  - Pt w/o elevated blood pressures here and w/o any in the office. Pt states she has had a 140/? on a home cuff before this pregnancy. No s/s   - HELLP labs sent   5. GBS neg  6. Pain: IV pain meds/epidural PRN    Jose Ramon Drummond, PGY-1  Obstetrics and Gynecology    Discussed with Dr. Coronado  A/P: Pt is a 30yo  @ 40.2wga who presents for IOL after having a NRFHT in the office today. Given gestational age, plan will be for IOL       1. Admit to LND. Routine Labs. IVF  2. IOL via PO Misoprostol   3. Fetus: Cat 1 tracing, cephalic  4. Elevated pressures  - Pt w/o elevated blood pressures here and w/o any in the office. Pt states she has had a 140/? on a home cuff before this pregnancy. No s/s   - HELLP labs sent   5. GBS neg  6. Pain: IV pain meds/epidural PRN    Jose Ramon Drummond, PGY-1  Obstetrics and Gynecology    Discussed with Dr. Coronado

## 2021-10-30 LAB
COVID-19 SPIKE DOMAIN AB INTERP: POSITIVE
COVID-19 SPIKE DOMAIN ANTIBODY RESULT: >250 U/ML — HIGH
SARS-COV-2 IGG+IGM SERPL QL IA: >250 U/ML — HIGH
SARS-COV-2 IGG+IGM SERPL QL IA: POSITIVE
T PALLIDUM AB TITR SER: NEGATIVE — SIGNIFICANT CHANGE UP

## 2021-10-30 RX ORDER — SODIUM CHLORIDE 9 MG/ML
1000 INJECTION, SOLUTION INTRAVENOUS
Refills: 0 | Status: DISCONTINUED | OUTPATIENT
Start: 2021-10-30 | End: 2021-10-31

## 2021-10-30 RX ORDER — OXYTOCIN 10 UNIT/ML
VIAL (ML) INJECTION
Qty: 30 | Refills: 0 | Status: DISCONTINUED | OUTPATIENT
Start: 2021-10-30 | End: 2021-10-31

## 2021-10-30 RX ADMIN — SODIUM CHLORIDE 125 MILLILITER(S): 9 INJECTION, SOLUTION INTRAVENOUS at 20:55

## 2021-10-30 RX ADMIN — Medication 0.25 MILLIGRAM(S): at 09:56

## 2021-10-30 RX ADMIN — Medication 2 MILLIUNIT(S)/MIN: at 13:06

## 2021-10-30 NOTE — OB PROVIDER LABOR PROGRESS NOTE - ASSESSMENT
p0 iol  cont pit  for epidural, anesthesia consult called    p0 iol  cont pit  for epidural, anesthesia consult called

## 2021-10-30 NOTE — OB PROVIDER LABOR PROGRESS NOTE - NS_SUBJECTIVE/OBJECTIVE_OBGYN_ALL_OB_FT
PGY1 Labor & Delivery Progress Note     Pt examined @ 0830 to evaluate for progression. Denies any significant discomfort with ctx    T(C): 36.6 (10-30-21 @ 07:33), Max: 37.5 (10-29-21 @ 11:29)  HR: 68 (10-30-21 @ 08:28) (58 - 94)  BP: 119/69 (10-30-21 @ 07:33) (105/70 - 134/85)  RR: 18 (10-30-21 @ 07:33) (16 - 18)  SpO2: 98% (10-30-21 @ 08:28) (93% - 100%)

## 2021-10-30 NOTE — OB PROVIDER LABOR PROGRESS NOTE - ASSESSMENT
-terb x1 given  -hold PO cytotec  -making cervical change  -continue IOL w/ PO cytotec when tracing recovers    d/w Dr. Ivonne Valenzuela PGY3

## 2021-10-30 NOTE — OB PROVIDER LABOR PROGRESS NOTE - ASSESSMENT
P0 iol now 3-4 cm  on low dose pitocin  discussed cont induction vs  pt desires to cont with induction of labor, risks/benefits discussed at length with pt and her . pt prefers to continue iol.  all questions answered  with tracing at this time reassuring and cervical change since epidural it is safe at this time to continue with induction

## 2021-10-30 NOTE — OB PROVIDER LABOR PROGRESS NOTE - ASSESSMENT
A/P:   31y  @ 40.3wga admitted after having decels in the office     #Labor   - s/p PO, completed up to 60mcg x4 @ 7242  - Will continue with PO  - Will discuss potentially getting a CRB    #Fetal Wellbeing   - Cat 1    #Pain Control   - Epidural, IV, and/or PO PRN    Jose Ramon Drummond, PGY-1    d/w Dr. Coronado A/P:   31y  @ 40.3wga admitted after having decels in the office     #Labor   - s/p PO, completed up to 60mcg x4 @ 0847  - Will continue with PO  - Will discuss potentially getting a CRB    #Fetal Wellbeing   - Cat 1    #Pain Control   - Epidural, IV, and/or PO PRN    Jose Ramon Drummond, PGY-1    d/w Dr. Coronado      OB Attg:  Pt seen and assessed. I agree with above assessment and plan. Pt declines cervical balloon at this time.  ANNA Coronado MD

## 2021-10-30 NOTE — PRE-ANESTHESIA EVALUATION ADULT - NSANTHOSAYNRD_GEN_A_CORE
No. ANATOLIY screening performed.  STOP BANG Legend: 0-2 = LOW Risk; 3-4 = INTERMEDIATE Risk; 5-8 = HIGH Risk
No. ANATOLIY screening performed.  STOP BANG Legend: 0-2 = LOW Risk; 3-4 = INTERMEDIATE Risk; 5-8 = HIGH Risk

## 2021-10-30 NOTE — OB PROVIDER LABOR PROGRESS NOTE - ASSESSMENT
Patient is a 30yo  @40w3d admitted for NSFHT IOL. Now s/p PO cytotec. Patient previously on pitocin but paused for CAT II FHT. Tracing recovered, however given patient contraction pattern and advanced cervical dilation will continue expectant management.    Delores Matute, PGY4  D/w Dr. Coronado, en route

## 2021-10-30 NOTE — OB PROVIDER LABOR PROGRESS NOTE - ASSESSMENT
A/P 31y P0 @ 40/3 wks IOL  -IOL: Currently on PO   -Cat 2 tracing  -Patient placed in left lateral position   -Anticipate     d/w Dr. Jared Nicole PGY-2

## 2021-10-30 NOTE — OB PROVIDER LABOR PROGRESS NOTE - NS_SUBJECTIVE/OBJECTIVE_OBGYN_ALL_OB_FT
R2 Labor Note    S: Patient evaluated at bedside for cervical change.     O:  T(C): 36.9 (10-30-21 @ 00:41), Max: 37.5 (10-29-21 @ 11:29)  HR: 63 (10-30-21 @ 00:55) (63 - 94)  BP: 130/61 (10-30-21 @ 00:41) (105/70 - 134/85)  RR: 18 (10-29-21 @ 20:09) (16 - 18)  SpO2: 97% (10-30-21 @ 00:55) (93% - 100%)

## 2021-10-30 NOTE — OB RN DELIVERY SUMMARY - NSSELHIDDEN_OBGYN_ALL_OB_FT
[NS_DeliveryAttending1_OBGYN_ALL_OB_FT:[id number]==],[NS_DeliveryRN_OBGYN_ALL_OB_FT:[id number]=]

## 2021-10-30 NOTE — OB PROVIDER LABOR PROGRESS NOTE - ASSESSMENT
P0 iol for cat 2 tracing in office now with cat 1 tracing,   cont pitocin  epi prn  discussed continuing iol and proceeding with   will proceed with iol

## 2021-10-30 NOTE — OB PROVIDER LABOR PROGRESS NOTE - ASSESSMENT
-pitocin decreased from 6 to 4 mu  -exam mostly unchanged  -d/w patient possibility of CS delivery if recurrent decelerations remote from delivery  -will reexamine in 1.5 hours to assess change    d/w Dr. Ivonne Valenzuela PGY3 -pitocin decreased from 6 to 4 mu  -exam mostly unchanged  -d/w patient possibility of CS delivery if recurrent decelerations remote from delivery  -will reexamine in 1.5 hours to assess change    d/w Dr. Coronado  [facility] PGY3    GYN Attg:  Pt seen and assessed. I agree with above assessment and plan.  ANNA Coronado MD

## 2021-10-30 NOTE — OB RN DELIVERY SUMMARY - NS_SEPSISRSKCALC_OBGYN_ALL_OB_FT
EOS calculated successfully. EOS Risk Factor: 0.5/1000 live births ([place] national incidence); GA=40w4d; Temp=99.5; ROM=8.667; GBS='Negative'; Antibiotics='No antibiotics or any antibiotics < 2 hrs prior to birth'

## 2021-10-31 PROCEDURE — 59400 OBSTETRICAL CARE: CPT

## 2021-10-31 RX ORDER — DIPHENHYDRAMINE HCL 50 MG
25 CAPSULE ORAL EVERY 6 HOURS
Refills: 0 | Status: DISCONTINUED | OUTPATIENT
Start: 2021-10-31 | End: 2021-11-02

## 2021-10-31 RX ORDER — BENZOCAINE 10 %
1 GEL (GRAM) MUCOUS MEMBRANE EVERY 6 HOURS
Refills: 0 | Status: DISCONTINUED | OUTPATIENT
Start: 2021-10-31 | End: 2021-11-02

## 2021-10-31 RX ORDER — HYDROCORTISONE 1 %
1 OINTMENT (GRAM) TOPICAL EVERY 6 HOURS
Refills: 0 | Status: DISCONTINUED | OUTPATIENT
Start: 2021-10-31 | End: 2021-11-02

## 2021-10-31 RX ORDER — IBUPROFEN 200 MG
600 TABLET ORAL EVERY 6 HOURS
Refills: 0 | Status: DISCONTINUED | OUTPATIENT
Start: 2021-10-31 | End: 2021-11-02

## 2021-10-31 RX ORDER — SODIUM CHLORIDE 9 MG/ML
3 INJECTION INTRAMUSCULAR; INTRAVENOUS; SUBCUTANEOUS EVERY 8 HOURS
Refills: 0 | Status: DISCONTINUED | OUTPATIENT
Start: 2021-10-31 | End: 2021-11-02

## 2021-10-31 RX ORDER — KETOROLAC TROMETHAMINE 30 MG/ML
30 SYRINGE (ML) INJECTION ONCE
Refills: 0 | Status: DISCONTINUED | OUTPATIENT
Start: 2021-10-31 | End: 2021-10-31

## 2021-10-31 RX ORDER — OXYTOCIN 10 UNIT/ML
333.33 VIAL (ML) INJECTION
Qty: 20 | Refills: 0 | Status: DISCONTINUED | OUTPATIENT
Start: 2021-10-31 | End: 2021-11-02

## 2021-10-31 RX ORDER — OXYCODONE HYDROCHLORIDE 5 MG/1
5 TABLET ORAL
Refills: 0 | Status: DISCONTINUED | OUTPATIENT
Start: 2021-10-31 | End: 2021-11-02

## 2021-10-31 RX ORDER — PRAMOXINE HYDROCHLORIDE 150 MG/15G
1 AEROSOL, FOAM RECTAL EVERY 4 HOURS
Refills: 0 | Status: DISCONTINUED | OUTPATIENT
Start: 2021-10-31 | End: 2021-11-02

## 2021-10-31 RX ORDER — TETANUS TOXOID, REDUCED DIPHTHERIA TOXOID AND ACELLULAR PERTUSSIS VACCINE, ADSORBED 5; 2.5; 8; 8; 2.5 [IU]/.5ML; [IU]/.5ML; UG/.5ML; UG/.5ML; UG/.5ML
0.5 SUSPENSION INTRAMUSCULAR ONCE
Refills: 0 | Status: DISCONTINUED | OUTPATIENT
Start: 2021-10-31 | End: 2021-11-02

## 2021-10-31 RX ORDER — OXYCODONE HYDROCHLORIDE 5 MG/1
5 TABLET ORAL ONCE
Refills: 0 | Status: DISCONTINUED | OUTPATIENT
Start: 2021-10-31 | End: 2021-11-02

## 2021-10-31 RX ORDER — LANOLIN
1 OINTMENT (GRAM) TOPICAL EVERY 6 HOURS
Refills: 0 | Status: DISCONTINUED | OUTPATIENT
Start: 2021-10-31 | End: 2021-11-02

## 2021-10-31 RX ORDER — ACETAMINOPHEN 500 MG
975 TABLET ORAL
Refills: 0 | Status: DISCONTINUED | OUTPATIENT
Start: 2021-10-31 | End: 2021-11-02

## 2021-10-31 RX ORDER — MAGNESIUM HYDROXIDE 400 MG/1
30 TABLET, CHEWABLE ORAL
Refills: 0 | Status: DISCONTINUED | OUTPATIENT
Start: 2021-10-31 | End: 2021-11-02

## 2021-10-31 RX ORDER — IBUPROFEN 200 MG
600 TABLET ORAL EVERY 6 HOURS
Refills: 0 | Status: COMPLETED | OUTPATIENT
Start: 2021-10-31 | End: 2022-09-29

## 2021-10-31 RX ORDER — AER TRAVELER 0.5 G/1
1 SOLUTION RECTAL; TOPICAL EVERY 4 HOURS
Refills: 0 | Status: DISCONTINUED | OUTPATIENT
Start: 2021-10-31 | End: 2021-11-02

## 2021-10-31 RX ORDER — DIBUCAINE 1 %
1 OINTMENT (GRAM) RECTAL EVERY 6 HOURS
Refills: 0 | Status: DISCONTINUED | OUTPATIENT
Start: 2021-10-31 | End: 2021-11-02

## 2021-10-31 RX ORDER — SIMETHICONE 80 MG/1
80 TABLET, CHEWABLE ORAL EVERY 4 HOURS
Refills: 0 | Status: DISCONTINUED | OUTPATIENT
Start: 2021-10-31 | End: 2021-11-02

## 2021-10-31 RX ADMIN — Medication 975 MILLIGRAM(S): at 16:15

## 2021-10-31 RX ADMIN — Medication 1000 MILLIUNIT(S)/MIN: at 02:34

## 2021-10-31 RX ADMIN — Medication 600 MILLIGRAM(S): at 12:58

## 2021-10-31 RX ADMIN — Medication 975 MILLIGRAM(S): at 20:58

## 2021-10-31 RX ADMIN — Medication 975 MILLIGRAM(S): at 21:30

## 2021-10-31 RX ADMIN — Medication 1 TABLET(S): at 12:52

## 2021-10-31 RX ADMIN — Medication 600 MILLIGRAM(S): at 17:48

## 2021-10-31 RX ADMIN — INFLUENZA VIRUS VACCINE 0.5 MILLILITER(S): 15; 15; 15; 15 SUSPENSION INTRAMUSCULAR at 20:58

## 2021-10-31 RX ADMIN — Medication 600 MILLIGRAM(S): at 18:56

## 2021-10-31 RX ADMIN — Medication 975 MILLIGRAM(S): at 08:37

## 2021-10-31 RX ADMIN — Medication 975 MILLIGRAM(S): at 09:40

## 2021-10-31 RX ADMIN — SODIUM CHLORIDE 3 MILLILITER(S): 9 INJECTION INTRAMUSCULAR; INTRAVENOUS; SUBCUTANEOUS at 12:37

## 2021-10-31 RX ADMIN — Medication 600 MILLIGRAM(S): at 12:16

## 2021-10-31 RX ADMIN — Medication 975 MILLIGRAM(S): at 15:03

## 2021-10-31 RX ADMIN — Medication 30 MILLIGRAM(S): at 03:53

## 2021-10-31 NOTE — OB PROVIDER LABOR PROGRESS NOTE - NS_OBIHIFHRDETAILS_OBGYN_ALL_OB_FT
140/mod/(+)accels/(-)decels
Baseline 135, Moderate Variability, + Accels, + Decels
baseline 150, mod sadaf, +late decels
category 1 tracing
baseline 140, mod sadaf, +4 minute prolonged decel to rufina of 70
cat 1 tracing
cat 1 tracing
mod sadaf occ overall reassuring tracing, occ sporadic late which resolves and then category 1 tracing
Cat I FHT  135BPM  moderate variability  +accels  -decels
baseline 150, mod sadaf, +accels

## 2021-10-31 NOTE — OB PROVIDER DELIVERY SUMMARY - NSPROVIDERDELIVERYNOTE_OBGYN_ALL_OB_FT
of viable female infant in OA position. Head delivered over RML. Shoulders and body delivered easily. no nuchal cord noted. Mouth and nares immediately suctioned. Cord clamped x 2 and cut after 30sec. Baby to nurses for stimulation. Placenta Delivered spont 3 VC, APGARs 7,9. Cervix and rectum intact. RML repaired with 2-0 vicryl suture in standard fashion. left periurethral tear abrasion approximated with 3-0 vicryl. Mother and baby recovering in stable condition. Excellent hemostasis.  Surg: Sternchos  EBL: 250mL

## 2021-10-31 NOTE — OB PROVIDER LABOR PROGRESS NOTE - NSVAGINALEXAM_OBGYN_ALL_OB_DT
30-Oct-2021 21:16
30-Oct-2021 01:00
30-Oct-2021 08:30
31-Oct-2021
30-Oct-2021
30-Oct-2021 23:43
30-Oct-2021 17:19
30-Oct-2021
30-Oct-2021 10:01

## 2021-10-31 NOTE — OB PROVIDER DELIVERY SUMMARY - NSSELHIDDEN_OBGYN_ALL_OB_FT
[NS_DeliveryAttending1_OBGYN_ALL_OB_FT:[id number]],[NS_DeliveryRN_OBGYN_ALL_OB_FT:[id number]]

## 2021-11-01 ENCOUNTER — TRANSCRIPTION ENCOUNTER (OUTPATIENT)
Age: 31
End: 2021-11-01

## 2021-11-01 RX ORDER — ACETAMINOPHEN 500 MG
3 TABLET ORAL
Qty: 0 | Refills: 0 | DISCHARGE
Start: 2021-11-01

## 2021-11-01 RX ORDER — IBUPROFEN 200 MG
1 TABLET ORAL
Qty: 0 | Refills: 0 | DISCHARGE
Start: 2021-11-01

## 2021-11-01 RX ADMIN — Medication 600 MILLIGRAM(S): at 06:50

## 2021-11-01 RX ADMIN — Medication 975 MILLIGRAM(S): at 21:00

## 2021-11-01 RX ADMIN — Medication 600 MILLIGRAM(S): at 00:45

## 2021-11-01 RX ADMIN — Medication 600 MILLIGRAM(S): at 12:20

## 2021-11-01 RX ADMIN — Medication 1 TABLET(S): at 11:21

## 2021-11-01 RX ADMIN — Medication 975 MILLIGRAM(S): at 02:49

## 2021-11-01 RX ADMIN — Medication 600 MILLIGRAM(S): at 11:22

## 2021-11-01 RX ADMIN — Medication 975 MILLIGRAM(S): at 14:28

## 2021-11-01 RX ADMIN — Medication 975 MILLIGRAM(S): at 09:44

## 2021-11-01 RX ADMIN — Medication 600 MILLIGRAM(S): at 17:48

## 2021-11-01 RX ADMIN — Medication 975 MILLIGRAM(S): at 20:28

## 2021-11-01 RX ADMIN — Medication 600 MILLIGRAM(S): at 00:07

## 2021-11-01 RX ADMIN — Medication 975 MILLIGRAM(S): at 10:35

## 2021-11-01 RX ADMIN — Medication 600 MILLIGRAM(S): at 05:57

## 2021-11-01 RX ADMIN — Medication 975 MILLIGRAM(S): at 15:20

## 2021-11-01 RX ADMIN — Medication 975 MILLIGRAM(S): at 03:30

## 2021-11-01 NOTE — DISCHARGE NOTE OB - CARE PLAN
Principal Discharge DX:	Spontaneous vaginal delivery  Assessment and plan of treatment:	Follow up in the office in 6weeks. Call to schedule an appointment.   1

## 2021-11-01 NOTE — DISCHARGE NOTE OB - ABILITY TO HEAR (WITH HEARING AID OR HEARING APPLIANCE IF NORMALLY USED):
Peripheral Angiogram Discharge Instructions - Femoral     After you go home:      Have an adult stay with you until tomorrow.    Drink extra fluids for 2 days.    You may resume your normal diet.    No smoking       For 24 hours - due to the sedation you received:    Relax and take it easy.    Do NOT make any important or legal decisions.    Do NOT drive or operate machines at home or at work.    Do NOT drink alcohol.    Care of Groin Puncture Site:      For the first 24 hrs - check the puncture site every 1-2 hours while awake.    For 2 days, when you cough, sneeze, laugh or move your bowels, hold your hand over the puncture site and press firmly.    Remove the bandaid after 24 hours. If there is minor oozing, apply another bandaid and remove it after 12 hours.    It is normal to have a small bruise or pea size lump at the site.    You may shower tomorrow.  Do NOT take a bath, or use a hot tub or pool for at least 3 days. Do NOT scrub the site. Do not use lotion or powder near the puncture site.     Activity:            For 2 days:    No stooping or squatting    Do NOT do any heavy activity such as exercise, lifting, or straining.     No housework, yard work or any activity that make you sweat    Do NOT lift more than 10 pounds    Bleeding:      If you start bleeding from the site in your groin, lie down flat and press firmly on/above the site for 10 minutes.     Once bleeding stops, lay flat for 2 hours.     [facility] as soon as you can.       Call 911 right away if you have heavy bleeding or bleeding that does not stop.      Medicines:      If you are on Metformin (Glucophage) and your GFR (kidney function level) is >30, you may continue taking your Metformin.    If you are on Metformin (Glucophage) and your GFR (kidney function level) is <30, do not restart the Metformin for 48 hours after your procedure. Check with your primary care giver before restarting the Metformin to see if you need  to have blood drawn to recheck your kidney function (GFR).    If you are taking an antiplatelet medication such as Plavix, do not stop taking it until you talk to your provider.       Take your medications, including blood thinners, unless your provider tells you not to.      If you take Coumadin (Warfarin), have your INR checked by your provider in  3-5 days. Call your clinic to schedule this.    If you have stopped any medicines, check with your provider about when to restart them.        Follow Up Appointments:      Follow [facility] with [facility] as directed.    Call the clinic if:      You have increased pain or a large or growing hard lump around the site.    The site is red, swollen, hot or tender.    Blood or fluid is draining from the site.    You have chills or a fever greater than 101 F (38 C).    Your leg feels numb, cool or changes color.    You have hives, a rash or unusual itching.    New pain in the back or belly that you cannot control with Tylenol.    Any questions or concerns.    Other Instructions:      If you received a stent - carry your stent card with you at all times.      If you have questions or your original symptoms do not improve, call:         [facility] @ [phone]       Adequate: hears normal conversation without difficulty

## 2021-11-01 NOTE — DISCHARGE NOTE OB - HOSPITAL COURSE
Pt admitted at 40 weeks for induction of labor due to nonreasuring fetal status. PT had uncomplicated labor and delivery. She had normal postpartum course. On postpartum day 1 the patient met criteria for discharge home. Pt admitted at 40 weeks for induction of labor due to nonreasuring fetal status. PT had uncomplicated labor and delivery. She had normal postpartum course.

## 2021-11-01 NOTE — DISCHARGE NOTE OB - PATIENT PORTAL LINK FT
You can access the FollowMyHealth Patient Portal offered by [facility] by registering at the following website: [email or web address]. By joining [facility]’s FollowMyHealth portal, you will also be able to view your health information using other applications (apps) compatible with our system.

## 2021-11-01 NOTE — DISCHARGE NOTE OB - MATERIALS PROVIDED
[facility] [place] Screening Program/[place]  Immunization Record/Breastfeeding Log/Breastfeeding Mother’s Support Group Information/Guide to Postpartum Care/[facility] Hearing Screen Program/Back To Sleep Handout/Shaken Baby Prevention Handout/Breastfeeding Guide and Packet/Birth Certificate Instructions/Discharge Medication Information for Patients and Families Pocket Guide

## 2021-11-01 NOTE — DISCHARGE NOTE OB - CARE PROVIDER_API CALL
Cliff Coronado)  Obstetrics and Gynecology  [address] [place] [address], [address]  [place] [address]  Phone: [phone]  Fax: [phone]  Follow Up Time:

## 2021-11-01 NOTE — DISCHARGE NOTE OB - MEDICATION SUMMARY - MEDICATIONS TO TAKE
I will START or STAY ON the medications listed below when I get home from the hospital:    acetaminophen 325 mg oral tablet  -- 3 tab(s) by mouth   -- Indication: For pain    ibuprofen 600 mg oral tablet  -- 1 tab(s) by mouth every 6 hours  -- Indication: For pain    Prenatal Multivitamins with Folic Acid 1 mg oral tablet  -- 1 tab(s) by mouth once a day  -- Indication: For nutritional supplement

## 2021-11-02 VITALS
SYSTOLIC BLOOD PRESSURE: 115 MMHG | OXYGEN SATURATION: 98 % | RESPIRATION RATE: 17 BRPM | DIASTOLIC BLOOD PRESSURE: 758 MMHG | HEART RATE: 72 BPM | TEMPERATURE: 98 F

## 2021-11-02 PROCEDURE — 87635 SARS-COV-2 COVID-19 AMP PRB: CPT

## 2021-11-02 PROCEDURE — 36415 COLL VENOUS BLD VENIPUNCTURE: CPT

## 2021-11-02 PROCEDURE — 85730 THROMBOPLASTIN TIME PARTIAL: CPT

## 2021-11-02 PROCEDURE — 84156 ASSAY OF PROTEIN URINE: CPT

## 2021-11-02 PROCEDURE — 82570 ASSAY OF URINE CREATININE: CPT

## 2021-11-02 PROCEDURE — 86900 BLOOD TYPING SEROLOGIC ABO: CPT

## 2021-11-02 PROCEDURE — 83615 LACTATE (LD) (LDH) ENZYME: CPT

## 2021-11-02 PROCEDURE — 59050 FETAL MONITOR W/REPORT: CPT

## 2021-11-02 PROCEDURE — 86769 SARS-COV-2 COVID-19 ANTIBODY: CPT

## 2021-11-02 PROCEDURE — 86780 TREPONEMA PALLIDUM: CPT

## 2021-11-02 PROCEDURE — 86901 BLOOD TYPING SEROLOGIC RH(D): CPT

## 2021-11-02 PROCEDURE — G0463: CPT

## 2021-11-02 PROCEDURE — 59025 FETAL NON-STRESS TEST: CPT

## 2021-11-02 PROCEDURE — 84550 ASSAY OF BLOOD/URIC ACID: CPT

## 2021-11-02 PROCEDURE — 85384 FIBRINOGEN ACTIVITY: CPT

## 2021-11-02 PROCEDURE — 86850 RBC ANTIBODY SCREEN: CPT

## 2021-11-02 PROCEDURE — 80053 COMPREHEN METABOLIC PANEL: CPT

## 2021-11-02 PROCEDURE — 90686 IIV4 VACC NO PRSV 0.5 ML IM: CPT

## 2021-11-02 PROCEDURE — 85610 PROTHROMBIN TIME: CPT

## 2021-11-02 PROCEDURE — 85025 COMPLETE CBC W/AUTO DIFF WBC: CPT

## 2021-11-02 PROCEDURE — 81003 URINALYSIS AUTO W/O SCOPE: CPT

## 2021-11-02 RX ADMIN — Medication 600 MILLIGRAM(S): at 06:25

## 2021-11-02 RX ADMIN — Medication 1 TABLET(S): at 12:47

## 2021-11-02 RX ADMIN — Medication 975 MILLIGRAM(S): at 16:20

## 2021-11-02 RX ADMIN — Medication 975 MILLIGRAM(S): at 04:00

## 2021-11-02 RX ADMIN — MAGNESIUM HYDROXIDE 30 MILLILITER(S): 400 TABLET, CHEWABLE ORAL at 10:37

## 2021-11-02 RX ADMIN — Medication 600 MILLIGRAM(S): at 00:16

## 2021-11-02 RX ADMIN — Medication 600 MILLIGRAM(S): at 00:50

## 2021-11-02 RX ADMIN — Medication 600 MILLIGRAM(S): at 12:47

## 2021-11-02 RX ADMIN — Medication 600 MILLIGRAM(S): at 07:00

## 2021-11-02 RX ADMIN — Medication 975 MILLIGRAM(S): at 03:23

## 2021-11-02 RX ADMIN — Medication 975 MILLIGRAM(S): at 15:53

## 2021-11-02 RX ADMIN — Medication 600 MILLIGRAM(S): at 13:17

## 2021-11-02 RX ADMIN — Medication 975 MILLIGRAM(S): at 09:00

## 2021-11-02 RX ADMIN — Medication 975 MILLIGRAM(S): at 08:30

## 2021-11-02 NOTE — PROGRESS NOTE ADULT - SUBJECTIVE AND OBJECTIVE BOX
PA Postpartum Note- PPD#1    Prenatal Labs  Blood type: A Positive  Rubella IgG: IMMune  RPR: Negative        S:Patient w/o complaints, pain is controlled.    Pt is OOB, tolerating PO, voiding. Lochia WNL.     O:  Vital Signs Last 24 Hrs  T(C): 36.4 (01 Nov 2021 05:14), Max: 36.8 (31 Oct 2021 21:00)  T(F): 97.5 (01 Nov 2021 05:14), Max: 98.2 (31 Oct 2021 21:00)  HR: 78 (01 Nov 2021 05:14) (70 - 78)  BP: 124/78 (01 Nov 2021 05:14) (111/63 - 126/77)  BP(mean): --  RR: 18 (01 Nov 2021 05:14) (17 - 18)  SpO2: 97% (01 Nov 2021 05:14) (97% - 99%)     Gen: NAD  Abdomen: Soft, nontender, non-distended, fundus firm.  Vaginal: Lochia WNL,    Ext: Neg edema, Neg calf tenderness    LABS:                
PPD#2- ATTENDING NOTE    S: Patient doing well. Minimal lochia. Pain controlled.    O: Vital Signs Last 24 Hrs  T(C): 36.6 (02 Nov 2021 05:00), Max: 36.9 (01 Nov 2021 17:14)  T(F): 97.9 (02 Nov 2021 05:00), Max: 98.4 (01 Nov 2021 17:14)  HR: 72 (02 Nov 2021 05:00) (72 - 80)  BP: 115/75 (02 Nov 2021 05:00) (110/77 - 115/75)  BP(mean): --  RR: 17 (02 Nov 2021 05:00) (17 - 18)  SpO2: 98% (02 Nov 2021 05:00) (97% - 98%)    Gen: NAD  Abd: soft, NT, ND, fundus firm below umbilicus  Lochia: moderate  Ext: no tenderness

## 2021-11-02 NOTE — PROGRESS NOTE ADULT - ASSESSMENT
A/P:  31y  PPD # 1   S/P   Doing Well    PMHx: asthma, last used inhaler years ago  Current Issues: none   
31y PPD#2 s/p  doing well.

## 2021-11-03 PROBLEM — J45.909 UNSPECIFIED ASTHMA, UNCOMPLICATED: Chronic | Status: ACTIVE | Noted: 2021-10-29

## 2021-11-03 PROBLEM — J30.2 OTHER SEASONAL ALLERGIC RHINITIS: Chronic | Status: ACTIVE | Noted: 2021-10-29

## 2021-12-07 ENCOUNTER — NON-APPOINTMENT (OUTPATIENT)
Age: 31
End: 2021-12-07

## 2021-12-10 ENCOUNTER — NON-APPOINTMENT (OUTPATIENT)
Age: 31
End: 2021-12-10

## 2021-12-29 ENCOUNTER — APPOINTMENT (OUTPATIENT)
Dept: OBGYN | Facility: CLINIC | Age: 31
End: 2021-12-29
Payer: COMMERCIAL

## 2021-12-29 PROCEDURE — 0503F POSTPARTUM CARE VISIT: CPT

## 2022-02-14 ENCOUNTER — APPOINTMENT (OUTPATIENT)
Dept: OBGYN | Facility: CLINIC | Age: 32
End: 2022-02-14
Payer: COMMERCIAL

## 2022-02-14 VITALS
SYSTOLIC BLOOD PRESSURE: 100 MMHG | HEIGHT: 65 IN | DIASTOLIC BLOOD PRESSURE: 70 MMHG | WEIGHT: 220 LBS | BODY MASS INDEX: 36.65 KG/M2

## 2022-02-14 DIAGNOSIS — Z01.419 ENCOUNTER FOR GYNECOLOGICAL EXAMINATION (GENERAL) (ROUTINE) W/OUT ABNORMAL FINDINGS: ICD-10-CM

## 2022-02-14 PROCEDURE — 99395 PREV VISIT EST AGE 18-39: CPT

## 2022-02-14 NOTE — HISTORY OF PRESENT ILLNESS
[TextBox_4] : 33yo  presents for routine gyn exam.  Pt. reports she is breast feeding and did get one period in January but it was darker than usual and nothing since.  Pt. has not yet become sexually active since giving birth and will use condoms when the time comes.\par Pt. with family hx breast cancer, mother age 56. \par \par Info. from prior EMR:\par Last visit 2021 post partum \par Demographics:  Race: White Native Language: English Occupation: /music therapist \par : 1  Para: 1001\par  she lives in [facility],  is a / in [facility]\par GYN History: No significant GYN history. \par Menarche Age: 10 Cycle: 28 days Duration: 5-7 days Sexually Active  \par \par Type of Contraception: none \par PMH\par  mild asthma, high cholesterol \par Surgical History: nose surgery for fracture age 1 \par Allergies: PCN \par Current Medications: Prescribed/Suppliments/OTC PNV, B6, Albuterol prn, Flonase prn \par Medications Verified Medications Verified \par Last PAP: 2020 \par \par Social History \par Patient nonsmoker and has no familial exposure to second hand smoke \par Family Hx\par  Diabetes: FATHER Heart Disease: FATHER Breast Cancer: mother (50) \par  Personal history of blood clots/DVT/PE: no  \par  Personal history of conditions causing increased risk of blood clots/DVT/PE: no  \par  Family history of blood clots/DVT/PE: no  \par  Family history of conditions causing increased risk of blood clots/DVT/PE: no

## 2022-02-14 NOTE — PLAN
[FreeTextEntry1] : HCM\par -SBE\par -pap & HPV\par -MVI, Calcium, Vit d\par -Weight/exercise\par Family Hx of breast cancer\par -baseline mammo -age 33\par RTO 1 year\par

## 2022-02-14 NOTE — PHYSICAL EXAM
[Chaperone Declined] : Patient declined chaperone [Appropriately responsive] : appropriately responsive [Alert] : alert [No Acute Distress] : no acute distress [No Lymphadenopathy] : no lymphadenopathy [Soft] : soft [Non-tender] : non-tender [Non-distended] : non-distended [No Lesions] : no lesions [No Mass] : no mass [Oriented x3] : oriented x3 [Examination Of The Breasts] : a normal appearance [No Masses] : no breast masses were palpable [Labia Majora] : normal [Labia Minora] : normal [Normal] : normal [Uterine Adnexae] : normal [FreeTextEntry4] : Color pink, no distress,' [FreeTextEntry5] : Resp. rate wnl, color pink, no SOB

## 2022-02-21 LAB
CYTOLOGY CVX/VAG DOC THIN PREP: NORMAL
HPV HIGH+LOW RISK DNA PNL CVX: NOT DETECTED

## 2022-06-09 ENCOUNTER — NON-APPOINTMENT (OUTPATIENT)
Age: 32
End: 2022-06-09

## 2022-06-11 ENCOUNTER — NON-APPOINTMENT (OUTPATIENT)
Age: 32
End: 2022-06-11

## 2022-06-12 ENCOUNTER — EMERGENCY (EMERGENCY)
Facility: HOSPITAL | Age: 32
LOS: 1 days | Discharge: HOME | End: 2022-06-12
Attending: EMERGENCY MEDICINE
Payer: COMMERCIAL

## 2022-06-12 VITALS
TEMPERATURE: 99 F | DIASTOLIC BLOOD PRESSURE: 81 MMHG | OXYGEN SATURATION: 100 % | SYSTOLIC BLOOD PRESSURE: 110 MMHG | HEIGHT: 65 IN | HEART RATE: 94 BPM | WEIGHT: 235.01 LBS | RESPIRATION RATE: 18 BRPM

## 2022-06-12 VITALS
DIASTOLIC BLOOD PRESSURE: 78 MMHG | RESPIRATION RATE: 18 BRPM | OXYGEN SATURATION: 99 % | SYSTOLIC BLOOD PRESSURE: 112 MMHG | HEART RATE: 89 BPM

## 2022-06-12 LAB
ALBUMIN SERPL ELPH-MCNC: 3.8 G/DL — SIGNIFICANT CHANGE UP (ref 3.3–5)
ALP SERPL-CCNC: 73 U/L — SIGNIFICANT CHANGE UP (ref 40–120)
ALT FLD-CCNC: 39 U/L — SIGNIFICANT CHANGE UP (ref 10–45)
ANION GAP SERPL CALC-SCNC: 10 MMOL/L — SIGNIFICANT CHANGE UP (ref 5–17)
AST SERPL-CCNC: 38 U/L — SIGNIFICANT CHANGE UP (ref 10–40)
BASE EXCESS BLDV CALC-SCNC: 2.8 MMOL/L — HIGH (ref -2–2)
BASOPHILS # BLD AUTO: 0.02 K/UL — SIGNIFICANT CHANGE UP (ref 0–0.2)
BASOPHILS NFR BLD AUTO: 0.2 % — SIGNIFICANT CHANGE UP (ref 0–2)
BILIRUB SERPL-MCNC: 0.9 MG/DL — SIGNIFICANT CHANGE UP (ref 0.2–1.2)
BLOOD GAS VENOUS - CREATININE: SIGNIFICANT CHANGE UP MG/DL (ref 0.5–1.3)
BUN SERPL-MCNC: 5 MG/DL — LOW (ref 7–23)
CA-I SERPL-SCNC: 1.22 MMOL/L — SIGNIFICANT CHANGE UP (ref 1.15–1.33)
CALCIUM SERPL-MCNC: 8.9 MG/DL — SIGNIFICANT CHANGE UP (ref 8.4–10.5)
CHLORIDE BLDV-SCNC: 105 MMOL/L — SIGNIFICANT CHANGE UP (ref 96–108)
CHLORIDE SERPL-SCNC: 106 MMOL/L — SIGNIFICANT CHANGE UP (ref 96–108)
CO2 BLDV-SCNC: 30 MMOL/L — HIGH (ref 22–26)
CO2 SERPL-SCNC: 23 MMOL/L — SIGNIFICANT CHANGE UP (ref 22–31)
CREAT SERPL-MCNC: 0.56 MG/DL — SIGNIFICANT CHANGE UP (ref 0.5–1.3)
EGFR: 123 ML/MIN/1.73M2 — SIGNIFICANT CHANGE UP
EOSINOPHIL # BLD AUTO: 0.05 K/UL — SIGNIFICANT CHANGE UP (ref 0–0.5)
EOSINOPHIL NFR BLD AUTO: 0.5 % — SIGNIFICANT CHANGE UP (ref 0–6)
GAS PNL BLDV: 136 MMOL/L — SIGNIFICANT CHANGE UP (ref 136–145)
GAS PNL BLDV: SIGNIFICANT CHANGE UP
GAS PNL BLDV: SIGNIFICANT CHANGE UP
GLUCOSE BLDV-MCNC: 93 MG/DL — SIGNIFICANT CHANGE UP (ref 70–99)
GLUCOSE SERPL-MCNC: 95 MG/DL — SIGNIFICANT CHANGE UP (ref 70–99)
HCO3 BLDV-SCNC: 28 MMOL/L — SIGNIFICANT CHANGE UP (ref 22–29)
HCT VFR BLD CALC: 41.4 % — SIGNIFICANT CHANGE UP (ref 34.5–45)
HCT VFR BLDA CALC: 41 % — SIGNIFICANT CHANGE UP (ref 34.5–46.5)
HGB BLD CALC-MCNC: 13.7 G/DL — SIGNIFICANT CHANGE UP (ref 11.7–16.1)
HGB BLD-MCNC: 13.3 G/DL — SIGNIFICANT CHANGE UP (ref 11.5–15.5)
IMM GRANULOCYTES NFR BLD AUTO: 0.3 % — SIGNIFICANT CHANGE UP (ref 0–1.5)
LACTATE BLDV-MCNC: 1.1 MMOL/L — SIGNIFICANT CHANGE UP (ref 0.7–2)
LYMPHOCYTES # BLD AUTO: 1.81 K/UL — SIGNIFICANT CHANGE UP (ref 1–3.3)
LYMPHOCYTES # BLD AUTO: 19.1 % — SIGNIFICANT CHANGE UP (ref 13–44)
MCHC RBC-ENTMCNC: 27.5 PG — SIGNIFICANT CHANGE UP (ref 27–34)
MCHC RBC-ENTMCNC: 32.1 GM/DL — SIGNIFICANT CHANGE UP (ref 32–36)
MCV RBC AUTO: 85.5 FL — SIGNIFICANT CHANGE UP (ref 80–100)
MONOCYTES # BLD AUTO: 0.98 K/UL — HIGH (ref 0–0.9)
MONOCYTES NFR BLD AUTO: 10.4 % — SIGNIFICANT CHANGE UP (ref 2–14)
NEUTROPHILS # BLD AUTO: 6.57 K/UL — SIGNIFICANT CHANGE UP (ref 1.8–7.4)
NEUTROPHILS NFR BLD AUTO: 69.5 % — SIGNIFICANT CHANGE UP (ref 43–77)
NRBC # BLD: 0 /100 WBCS — SIGNIFICANT CHANGE UP (ref 0–0)
PCO2 BLDV: 47 MMHG — HIGH (ref 39–42)
PH BLDV: 7.39 — SIGNIFICANT CHANGE UP (ref 7.32–7.43)
PLATELET # BLD AUTO: 272 K/UL — SIGNIFICANT CHANGE UP (ref 150–400)
PO2 BLDV: 23 MMHG — LOW (ref 25–45)
POTASSIUM BLDV-SCNC: 4.3 MMOL/L — SIGNIFICANT CHANGE UP (ref 3.5–5.1)
POTASSIUM SERPL-MCNC: 4.3 MMOL/L — SIGNIFICANT CHANGE UP (ref 3.5–5.3)
POTASSIUM SERPL-SCNC: 4.3 MMOL/L — SIGNIFICANT CHANGE UP (ref 3.5–5.3)
PROT SERPL-MCNC: 6.7 G/DL — SIGNIFICANT CHANGE UP (ref 6–8.3)
RAPID RVP RESULT: SIGNIFICANT CHANGE UP
RBC # BLD: 4.84 M/UL — SIGNIFICANT CHANGE UP (ref 3.8–5.2)
RBC # FLD: 13.2 % — SIGNIFICANT CHANGE UP (ref 10.3–14.5)
SAO2 % BLDV: 28.5 % — LOW (ref 67–88)
SARS-COV-2 RNA SPEC QL NAA+PROBE: SIGNIFICANT CHANGE UP
SODIUM SERPL-SCNC: 139 MMOL/L — SIGNIFICANT CHANGE UP (ref 135–145)
WBC # BLD: 9.46 K/UL — SIGNIFICANT CHANGE UP (ref 3.8–10.5)
WBC # FLD AUTO: 9.46 K/UL — SIGNIFICANT CHANGE UP (ref 3.8–10.5)

## 2022-06-12 PROCEDURE — 99284 EMERGENCY DEPT VISIT MOD MDM: CPT

## 2022-06-12 PROCEDURE — 85018 HEMOGLOBIN: CPT

## 2022-06-12 PROCEDURE — 83605 ASSAY OF LACTIC ACID: CPT

## 2022-06-12 PROCEDURE — 96375 TX/PRO/DX INJ NEW DRUG ADDON: CPT

## 2022-06-12 PROCEDURE — 82330 ASSAY OF CALCIUM: CPT

## 2022-06-12 PROCEDURE — 85025 COMPLETE CBC W/AUTO DIFF WBC: CPT

## 2022-06-12 PROCEDURE — 0225U NFCT DS DNA&RNA 21 SARSCOV2: CPT

## 2022-06-12 PROCEDURE — 96361 HYDRATE IV INFUSION ADD-ON: CPT

## 2022-06-12 PROCEDURE — 82565 ASSAY OF CREATININE: CPT

## 2022-06-12 PROCEDURE — 82435 ASSAY OF BLOOD CHLORIDE: CPT

## 2022-06-12 PROCEDURE — 99284 EMERGENCY DEPT VISIT MOD MDM: CPT | Mod: 25

## 2022-06-12 PROCEDURE — 84702 CHORIONIC GONADOTROPIN TEST: CPT

## 2022-06-12 PROCEDURE — 82947 ASSAY GLUCOSE BLOOD QUANT: CPT

## 2022-06-12 PROCEDURE — 82803 BLOOD GASES ANY COMBINATION: CPT

## 2022-06-12 PROCEDURE — 96374 THER/PROPH/DIAG INJ IV PUSH: CPT

## 2022-06-12 PROCEDURE — 80053 COMPREHEN METABOLIC PANEL: CPT

## 2022-06-12 PROCEDURE — 83690 ASSAY OF LIPASE: CPT

## 2022-06-12 PROCEDURE — 85014 HEMATOCRIT: CPT

## 2022-06-12 PROCEDURE — 84132 ASSAY OF SERUM POTASSIUM: CPT

## 2022-06-12 PROCEDURE — 84295 ASSAY OF SERUM SODIUM: CPT

## 2022-06-12 PROCEDURE — 36415 COLL VENOUS BLD VENIPUNCTURE: CPT

## 2022-06-12 RX ORDER — ACETAMINOPHEN 500 MG
1000 TABLET ORAL ONCE
Refills: 0 | Status: COMPLETED | OUTPATIENT
Start: 2022-06-12 | End: 2022-06-12

## 2022-06-12 RX ORDER — SODIUM CHLORIDE 9 MG/ML
1000 INJECTION, SOLUTION INTRAVENOUS ONCE
Refills: 0 | Status: COMPLETED | OUTPATIENT
Start: 2022-06-12 | End: 2022-06-12

## 2022-06-12 RX ORDER — SODIUM CHLORIDE 9 MG/ML
1000 INJECTION INTRAMUSCULAR; INTRAVENOUS; SUBCUTANEOUS ONCE
Refills: 0 | Status: COMPLETED | OUTPATIENT
Start: 2022-06-12 | End: 2022-06-12

## 2022-06-12 RX ORDER — ONDANSETRON 8 MG/1
4 TABLET, FILM COATED ORAL ONCE
Refills: 0 | Status: COMPLETED | OUTPATIENT
Start: 2022-06-12 | End: 2022-06-12

## 2022-06-12 RX ADMIN — SODIUM CHLORIDE 1000 MILLILITER(S): 9 INJECTION INTRAMUSCULAR; INTRAVENOUS; SUBCUTANEOUS at 20:49

## 2022-06-12 RX ADMIN — SODIUM CHLORIDE 1000 MILLILITER(S): 9 INJECTION, SOLUTION INTRAVENOUS at 21:02

## 2022-06-12 RX ADMIN — ONDANSETRON 4 MILLIGRAM(S): 8 TABLET, FILM COATED ORAL at 20:57

## 2022-06-12 RX ADMIN — Medication 1000 MILLIGRAM(S): at 21:02

## 2022-06-12 RX ADMIN — SODIUM CHLORIDE 1000 MILLILITER(S): 9 INJECTION, SOLUTION INTRAVENOUS at 20:56

## 2022-06-12 RX ADMIN — Medication 400 MILLIGRAM(S): at 20:57

## 2022-06-12 RX ADMIN — SODIUM CHLORIDE 1000 MILLILITER(S): 9 INJECTION INTRAMUSCULAR; INTRAVENOUS; SUBCUTANEOUS at 23:03

## 2022-06-12 NOTE — ED PROVIDER NOTE - PHYSICAL EXAMINATION
PHYSICAL EXAM:  CONSTITUTIONAL: Well appearing, awake, alert, oriented to person, place, time/situation and in no apparent distress.  HEAD: Atraumatic  EYES: Clear bilaterally, pupils equal, round and reactive to light.  ENMT: Airway patent, Nasal mucosa clear. Mouth with normal mucosa. Uvula is midline.   CARDIAC: Normal rate, regular rhythm.  +S1/S2.  No murmurs, rubs or gallops.  RESPIRATORY: Breathing unlabored. Breath sounds clear and equal bilaterally.  ABDOMEN:  Mild left periumbilical tenderness to palpation. Soft, nondistended. No rebound tenderness or guarding.   NEUROLOGICAL: Alert and oriented, no focal deficits, no motor or sensory deficits.   SKIN: Skin warm and dry. No evidence of rashes or lesions.

## 2022-06-12 NOTE — ED PROVIDER NOTE - PATIENT PORTAL LINK FT
You can access the FollowMyHealth Patient Portal offered by [facility] by registering at the following website: [email or web address]. By joining [facility]’s FollowMyHealth portal, you will also be able to view your health information using other applications (apps) compatible with our system.

## 2022-06-12 NOTE — ED PROVIDER NOTE - NS ED ROS FT
ROS:  -Constitutional: +fever  -Head: Denies headache  -Eyes: Denies blurry vision  -Cardiovascular: Denies chest pain  -Pulmonary: Denies shortness of breath  -Gastrointestinal: Diarrhea  -Genitourinary: Denies dysuria  -Skin: Denies new rashes  -Neuro: Denies numbness or tingling

## 2022-06-12 NOTE — ED PROVIDER NOTE - ATTENDING CONTRIBUTION TO CARE
Attending MD Maki: I personally have seen and examined this patient.  Resident note reviewed and agree on plan of care and except where noted.  See below for details.     seen in [facility]    32F with PMH including "mild" asthma (never intubated, never ICU), denies PSH, reports allergy to PCN (rash) presents to the ED with abdominal pain.  Reports on Thursday developed body aches, fever (100.3), diarrhea    TO BE COMPLETED     Reports Friday Tmax 102.5. Attending MD Maki: I personally have seen and examined this patient.  Resident note reviewed and agree on plan of care and except where noted.  See below for details.     seen in [place] 53    32F with PMH including "mild" asthma (never intubated, never ICU), denies PSH, reports allergy to PCN (rash) presents to the ED with abdominal pain.  Reports on Thursday developed body aches, fever (100.3), diarrhea.  Reports had generalized     TO BE COMPLETED     Reports Friday Tmax 102.5. Attending MD Maki: I personally have seen and examined this patient.  Resident note reviewed and agree on plan of care and except where noted.  See below for details.     seen in [place] 53    32F with PMH including "mild" asthma (never intubated, never ICU), denies PSH, reports allergy to PCN (rash) presents to the ED with abdominal pain.  Reports on Thursday developed body aches, fever (100.3), diarrhea.  Reports had damon-umbilical pain at that time.  Reports had generalized malaise.  Denies vomiting, reports nausea.  Reports decreased appetite.  Reports Friday Tmax 102.5.  Reports took Advil for symptoms at around 13:00 with mild relief.  Reports LMP due today or yesterday, reports not atypical for her to be a few days late.  Denies vaginal bleeding, vaginal discharge.  Denies chest pain, shortness of breath.  Reports has had 10-15 BMs a day.  Denies black or bloody.  Denies cough, URI, sick contacts, recent travel.      Exam:   General: NAD  HENT: head NCAT, airway patent   Eyes: anicteric, no conjunctival injection   Lungs: lungs CTAB with good inspiratory effort, no wheezing, no rhonchi, no rales  Cardiac: +S1S2, no obvious m/r/g  GI: abdomen soft with +BS, +mild tenderness to the L side of abdomen, no rebound, no guarding, ND  : no CVAT  MSK: FROM at neck, no tenderness to midline palpation, no stepoffs along length of spine, no calf tenderness, swelling, erythema or warmth  Neuro: moving all extremities spontaneously, sensory grossly intact, no gross neuro deficits  Psych: normal mood and affect    A/P: 32F with abdominal pain in setting of fevers, body aches, diarrhea, concern for viral etiology, colitis, lower suspicion for diverticulitis    TO BE COMPLETED Attending MD Maki: I personally have seen and examined this patient.  Resident note reviewed and agree on plan of care and except where noted.  See below for details.     seen in [place] 55    32F with PMH including "mild" asthma (never intubated, never ICU), denies PSH, reports allergy to PCN (rash) presents to the ED with abdominal pain.  Reports on Thursday developed body aches, fever (100.3), diarrhea.  Reports had damon-umbilical pain at that time.  Reports had generalized malaise.  Denies vomiting, reports nausea.  Reports decreased appetite.  Reports Friday Tmax 102.5.  Reports took Advil for symptoms at around 13:00 with mild relief.  Reports LMP due today or yesterday, reports not atypical for her to be a few days late.  Denies vaginal bleeding, vaginal discharge.  Denies dysuria, hematuria, change in urinary habits including frequency, urgency. Denies chest pain, shortness of breath.  Reports has had 10-15 BMs a day.  Denies black or bloody.  Denies cough, URI, sick contacts, recent travel.      Exam:   General: NAD  HENT: head NCAT, airway patent   Eyes: anicteric, no conjunctival injection   Lungs: lungs CTAB with good inspiratory effort, no wheezing, no rhonchi, no rales  Cardiac: +S1S2, no obvious m/r/g  GI: abdomen soft with +BS, +mild tenderness to the L side of abdomen, no rebound, no guarding, ND  : no CVAT  MSK: FROM at neck, no tenderness to midline palpation, no stepoffs along length of spine, no calf tenderness, swelling, erythema or warmth  Neuro: moving all extremities spontaneously, sensory grossly intact, no gross neuro deficits  Psych: normal mood and affect    A/P: 32F with abdominal pain in setting of fevers, body aches, diarrhea, concern for viral etiology, colitis, lower suspicion for diverticulitis, will obtain labs for metabolic derangement, will give IVFs for repletion of losses from diarrhea, will give Tylenol for mild abdominal pain, will check viral panel, if BM here, will consider GI PCR, will reassess

## 2022-06-12 NOTE — ED PROVIDER NOTE - OBJECTIVE STATEMENT
32 year old female with PMH asthma presents with 3 days of multiple episodes of watery brown diarrhea associated with nausea, loss of appetite, fever, headache. Denies chest pain, shortness of breath, vomiting, diarrhea, dysuria, hematuria, vaginal bleeding, or any other symptoms. Took advil for symptoms at 1pm with some relief.

## 2022-06-12 NOTE — ED PROVIDER NOTE - CHIEF COMPLAINT
Called to reschedule no show appointment and the phone was not accepting calls at this time.
The patient is a 32y Female complaining of abdominal pain.

## 2022-06-12 NOTE — OB RN TRIAGE NOTE - HARM RISK FACTORS
Not requiring supplemental oxygen  Noted mild shortness of breath after fever last night  Tested positive on 05/31, last day of isolation 6/9 - as  perprior documentation  no

## 2022-06-12 NOTE — ED ADULT NURSE NOTE - OBJECTIVE STATEMENT
pt has had diarrhea and abd pain since thursday  she is here for evaluation  allie is not tender and not distended

## 2022-06-12 NOTE — ED PROVIDER NOTE - NSFOLLOWUPINSTRUCTIONS_ED_ALL_ED_FT
You were seen in the emergency department for your diarrhea.    While you were seen here you had bloodwork done, a swab, and fluids given. You received 1000 mg of acetaminophen. Please take this into consideration if you take additional tylenol.    Please see the info sheet on diarrhea for more information.   For fevers/chills, take tylenol 1000 mg every 8 hours. Do not exceed 3000 mg in one 24-hour period.    Continue all regular home medications as prescribed.    Follow up with your primary doctor within 3 days.  Follow up with your OBGYN within 3 days regarding the breast tenderness. Monitor for signs of infection worsening redness, swelling, discharge, pain.     You were given copies of the labs and imaging results, please take them to your follow up appointments.    Return to the ER for any worsening symptoms or concerns.

## 2022-06-12 NOTE — ED PROVIDER NOTE - PROGRESS NOTE DETAILS
Adelina Parkinson DO PGY-1  Patient re-assessed, discussed results. Symptoms improved. Discussed plan for discharge home with pcp f/u and advised strict return precautions. Patient expresses understanding and agrees with the plan. All questions and concerns were addressed.     Examined breast because patient c/o tenderness before pumping, minimal erythema at 3 o'clock to 6 o'clock region, no edema, mild tenderness. No discharge. Attending MD Maki: Patient re-evaluated and feeling improved.  No acute issues at  this time.  Lab tests reviewed with patient.  Patient stable for discharge. Discussed questionable area of minimal erythema at around 4 oclock on R breast areola.  Advised to keep breast feeding (7 mo old infant) and monitor for increasing redness, induration or pain.  Verbalized understanding.  Patient with no diarrhea while in the Emergency Department, reports last was while in the waiting room.  Stable for discharge. Follow up instructions given, importance of follow up emphasized with PMD in 1-3 days, return to ED parameters reviewed and patient verbalized understanding.  All questions answered, all concerns addressed.

## 2022-06-12 NOTE — ED PROVIDER NOTE - CLINICAL SUMMARY MEDICAL DECISION MAKING FREE TEXT BOX
Adelina Parkinson DO PGY-1  32 year old female with pmh asthma presents with 3 days of watery diarrhea, fever, headache. Hemodynamically stable, afebrile, abdomen soft and mild ttp left periumbilical region. Will check labs, give fluids, re-assess.

## 2022-06-14 DIAGNOSIS — Z88.0 ALLERGY STATUS TO PENICILLIN: ICD-10-CM

## 2022-06-14 DIAGNOSIS — Z20.822 CONTACT WITH AND (SUSPECTED) EXPOSURE TO COVID-19: ICD-10-CM

## 2022-06-14 DIAGNOSIS — R11.10 VOMITING, UNSPECIFIED: ICD-10-CM

## 2022-06-14 DIAGNOSIS — R63.0 ANOREXIA: ICD-10-CM

## 2022-06-14 DIAGNOSIS — R50.9 FEVER, UNSPECIFIED: ICD-10-CM

## 2022-06-14 DIAGNOSIS — J45.909 UNSPECIFIED ASTHMA, UNCOMPLICATED: ICD-10-CM

## 2022-06-14 DIAGNOSIS — R10.33 PERIUMBILICAL PAIN: ICD-10-CM

## 2022-06-14 DIAGNOSIS — R51.9 HEADACHE, UNSPECIFIED: ICD-10-CM

## 2022-06-14 DIAGNOSIS — R19.7 DIARRHEA, UNSPECIFIED: ICD-10-CM

## 2022-10-03 NOTE — DISCHARGE NOTE OB - DISCHARGE DISPOSITION
Pt has been added to [facility]3] Home monitoring   Home/with Baby

## 2025-06-16 NOTE — DISCHARGE NOTE OB - PRINCIPAL DIAGNOSIS
Identified pt with two pt identifiers(name and ). Reviewed record in preparation for visit and have obtained necessary documentation. All patient medications has been reviewed.    Chief Complaint   Patient presents with    SANDIP confirmation     Patient accepts    Follow-up     Discuss Hypothyroid         Vitals:    25 1232   BP: (!) 150/90   BP Site: Left Lower Arm   Patient Position: Sitting   BP Cuff Size: Medium Adult   Pulse: 69   Weight: (!) 171.5 kg (378 lb)      .  \"Have you been to the ER, urgent care clinic since your last visit?  Hospitalized since your last visit?\"    no    “Have you seen or consulted any other health care providers outside our system since your last visit?”    no    
Chief Complaint   Patient presents with    SANDIP confirmation     Patient accepts    Follow-up     Discuss Hypothyroid     History of Present Illness: Liz Gillis is a 51 y.o. female here for follow up of diabetes.  Weight down 15 lbs since last visit in 3/25.      History of Present Illness  The patient is a 51-year-old female here for follow-up of diabetes and thyroid.    She reports a positive response to the increased dosage of compounded semaglutide, with no adverse effects such as nausea or stomach upset. However, she has been experiencing constipation, which she attributes to the medication. She has not sought any over-the-counter remedies for this issue and has not developed any hemorrhoids. She has been on compounded semaglutide 2.4 mg since 03/20/2025. Her weight has decreased by 15 pounds since the last visit, and her A1c has improved to 6.1 from 7.3.    She continues her regimen of Unithroid 200 mcg, administered in the middle of the night. Her TSH level is 0.93, which is the lowest it has been in a while.    Her blood pressure remains elevated despite her current medication regimen, which includes amlodipine 5 mg daily, metoprolol 200 mg, and losartan 100 mg. She reports decreased frequency of urination and increased ankle swelling, which started with changing the chlorthalidone to amlodipine. Additionally, she experiences cramping sensations in her feet. She has a home blood pressure monitor and regularly checks her readings.     She has noticed changes in her skin condition, including dryness around her nose, and is curious if these could be side effects of her medications.      Current Outpatient Medications   Medication Sig    Semaglutide-Weight Management (WEGOVY) 2.4 MG/0.75ML SOAJ SC injection Inject 2.4 mg into the skin every 7 days Please dispense one box of 4 syringes of the 2.4 mg dose    amLODIPine (NORVASC) 5 MG tablet Take 1 tablet by mouth daily Replaces chlorthalidone for blood pressure 
Spontaneous vaginal delivery